# Patient Record
Sex: FEMALE
[De-identification: names, ages, dates, MRNs, and addresses within clinical notes are randomized per-mention and may not be internally consistent; named-entity substitution may affect disease eponyms.]

---

## 2019-01-23 LAB
ALBUMIN SERPL-MCNC: 3 G/DL (ref 3.5–5)
ALBUMIN/GLOB SERPL: 0.7 {RATIO} (ref 0.8–2)
ALP SERPL-CCNC: 192 IU/L (ref 40–150)
ALT SERPL-CCNC: 22 IU/L (ref 0–55)
ANION GAP SERPL CALC-SCNC: 9.1 MMOL/L (ref 8–16)
BASOPHILS # BLD AUTO: 0 10*3/UL (ref 0–0.1)
BASOPHILS NFR BLD AUTO: 0.4 % (ref 0–1)
BUN SERPL-MCNC: 17 MG/DL (ref 7–26)
BUN/CREAT SERPL: 24 (ref 6–25)
CALCIUM SERPL-MCNC: 8.9 MG/DL (ref 8.4–10.2)
CHLORIDE SERPL-SCNC: 102 MMOL/L (ref 98–107)
CO2 SERPL-SCNC: 27 MMOL/L (ref 22–29)
DEPRECATED APTT PLAS QN: 38.9 SECONDS (ref 23.8–35.5)
DEPRECATED INR PLAS: 1.19
DEPRECATED NEUTROPHILS # BLD AUTO: 1.3 10*3/UL (ref 2.1–6.9)
EGFRCR SERPLBLD CKD-EPI 2021: > 60 ML/MIN (ref 60–?)
EOSINOPHIL # BLD AUTO: 0.1 10*3/UL (ref 0–0.4)
EOSINOPHIL NFR BLD AUTO: 2.9 % (ref 0–6)
ERYTHROCYTE [DISTWIDTH] IN CORD BLOOD: 14.8 % (ref 11.7–14.4)
GLOBULIN PLAS-MCNC: 4.3 G/DL (ref 2.3–3.5)
GLUCOSE SERPLBLD-MCNC: 239 MG/DL (ref 74–118)
HCT VFR BLD AUTO: 37.1 % (ref 34.2–44.1)
HGB BLD-MCNC: 12.5 G/DL (ref 12–16)
LYMPHOCYTES # BLD: 1 10*3/UL (ref 1–3.2)
LYMPHOCYTES NFR BLD AUTO: 34.1 % (ref 18–39.1)
MCH RBC QN AUTO: 32.2 PG (ref 28–32)
MCHC RBC AUTO-ENTMCNC: 33.7 G/DL (ref 31–35)
MCV RBC AUTO: 95.6 FL (ref 81–99)
MONOCYTES # BLD AUTO: 0.4 10*3/UL (ref 0.2–0.8)
MONOCYTES NFR BLD AUTO: 15.4 % (ref 4.4–11.3)
NEUTS SEG NFR BLD AUTO: 47.2 % (ref 38.7–80)
PLATELET # BLD AUTO: 97 X10E3/UL (ref 140–360)
POTASSIUM SERPL-SCNC: 4.1 MMOL/L (ref 3.5–5.1)
PROTHROMBIN TIME: 16.2 SECONDS (ref 11.9–14.5)
RBC # BLD AUTO: 3.88 X10E6/UL (ref 3.6–5.1)
SODIUM SERPL-SCNC: 134 MMOL/L (ref 136–145)

## 2019-01-25 ENCOUNTER — HOSPITAL ENCOUNTER (OUTPATIENT)
Dept: HOSPITAL 88 - OR | Age: 69
Discharge: HOME | End: 2019-01-25
Attending: UROLOGY
Payer: MEDICARE

## 2019-01-25 VITALS — SYSTOLIC BLOOD PRESSURE: 108 MMHG | DIASTOLIC BLOOD PRESSURE: 64 MMHG

## 2019-01-25 DIAGNOSIS — N95.2: ICD-10-CM

## 2019-01-25 DIAGNOSIS — K74.60: ICD-10-CM

## 2019-01-25 DIAGNOSIS — Z01.812: ICD-10-CM

## 2019-01-25 DIAGNOSIS — R53.1: ICD-10-CM

## 2019-01-25 DIAGNOSIS — Z01.810: ICD-10-CM

## 2019-01-25 DIAGNOSIS — M19.90: ICD-10-CM

## 2019-01-25 DIAGNOSIS — Z79.84: ICD-10-CM

## 2019-01-25 DIAGNOSIS — I83.90: ICD-10-CM

## 2019-01-25 DIAGNOSIS — E11.9: ICD-10-CM

## 2019-01-25 DIAGNOSIS — N39.0: Primary | ICD-10-CM

## 2019-01-25 DIAGNOSIS — K21.9: ICD-10-CM

## 2019-01-25 DIAGNOSIS — K44.9: ICD-10-CM

## 2019-01-25 DIAGNOSIS — N81.3: ICD-10-CM

## 2019-01-25 DIAGNOSIS — I10: ICD-10-CM

## 2019-01-25 PROCEDURE — 85610 PROTHROMBIN TIME: CPT

## 2019-01-25 PROCEDURE — 74420 UROGRAPHY RTRGR +-KUB: CPT

## 2019-01-25 PROCEDURE — 36415 COLL VENOUS BLD VENIPUNCTURE: CPT

## 2019-01-25 PROCEDURE — 52005 CYSTO W/URTRL CATHJ: CPT

## 2019-01-25 PROCEDURE — 85730 THROMBOPLASTIN TIME PARTIAL: CPT

## 2019-01-25 PROCEDURE — 80053 COMPREHEN METABOLIC PANEL: CPT

## 2019-01-25 PROCEDURE — 93005 ELECTROCARDIOGRAM TRACING: CPT

## 2019-01-25 PROCEDURE — 85025 COMPLETE CBC W/AUTO DIFF WBC: CPT

## 2019-01-25 PROCEDURE — 82948 REAGENT STRIP/BLOOD GLUCOSE: CPT

## 2019-01-25 NOTE — XMS REPORT
Summary of Care: 3/26/18 - 3/26/18

                             Created on: 2034



ADITI JOSEPH

External Reference #: [id number]

: 1950

Sex: Female



Demographics







                          Address                   [address] [place] [address]  [address]

 

                          Home Phone                [phone]

 

                          Preferred Language        English

 

                          Marital Status            

 

                          Quaker Affiliation     None

 

                          Race                      Other

 

                                        Additional Race(s)  

 

                          Ethnic Group              Non-





Author







                          Author                    [facility]

 

                          Address                   Unknown

 

                          Phone                     Unavailable







Encounter





HQ Luda_jeny(FIN) [id number] Date(s): 3/26/18 - 3/26/18

[facility]

Attending Physician: Marycruz Diamond DO





Vital Signs





No data available for this section



Problem List







    



              Condition     Effective Dates     Status       Health Status     Informant

 

    



                           Diabetes(Confirmed)       Active  

 

    



                           Acid reflux               Active  



                                         disease(Confirmed)    

 

    



                           Hypertension(Confirm      Active  



                                         ed)    

 

    



                           Radiculopathy(Confir      Active  



                                         med)1    

 

    



                           Simple                    Active  



                                         obesity(Confirmed)    







1L3-4



Allergies, Adverse Reactions, Alerts







   



                 Substance       Reaction        Severity        Status

 

   



                           NKDA                      Active







Medications





No data available for this section



Results





No data available for this section



Immunizations





No data available for this section



Procedures







    



              Procedure     Date         Related Diagnosis     Body Site     Status

 

    



                           Biopsy of liver           Completed

 

    



                            section          Completed

 

    



                           Cholecystectomy           Completed

 

    



                           Colonoscopy               Completed

 

    



                           Excision of cyst          Completed

 

    



                           Lumpectomy of breast        Completed







Social History







 



                           Social History Type       Response

 

 



                           Smoking Status            Never smoker; Exposure to Tobacco Smoke None; Cigarette Smoking

 Last 365



                                         Days No; Reg Smoking Cessation Counseling No



                                         entered on: 18







Assessment and Plan





No data available for this section

## 2019-01-25 NOTE — XMS REPORT
Summary of Care: 18 - 5/10/18

                             Created on: 2100



ADITI JOSEPH

External Reference #: [id number]

: 1950

Sex: Female



Demographics







                          Address                   [place]  [address]

 

                          Home Phone                [phone]

 

                          Preferred Language        English

 

                          Marital Status            

 

                          Amish Affiliation     None

 

                          Race                      Other

 

                                        Additional Race(s)  

 

                          Ethnic Group              Non-





Author







                          Author                    [facility]

 

                          Organization              [facility]

 

                          Address                   Unknown

 

                          Phone                     Unavailable







Encounter





HQ Luis Antonior_jeny(FIN) [id number] Date(s): 18 - 5/10/18

[facility] [address] [facility], [address] [place] [address]-    
 





Vital Signs





No data available for this section



Problem List







    



              Condition     Effective Dates     Status       Health Status     Informant

 

    



                           Diabetes(Confirmed)       Active  

 

    



                           Acid reflux               Active  



                                         disease(Confirmed)    

 

    



                           Hypertension(Confirm      Active  



                                         ed)    

 

    



                           Radiculopathy(Confir      Active  



                                         med)1    

 

    



                           Simple                    Active  



                                         obesity(Confirmed)    







1L3-4



Allergies, Adverse Reactions, Alerts







   



                 Substance       Reaction        Severity        Status

 

   



                           NKDA                      Active







Medications





No data available for this section



Results





No data available for this section



Immunizations





No data available for this section



Procedures







    



              Procedure     Date         Related Diagnosis     Body Site     Status

 

    



                           Biopsy of liver           Completed

 

    



                            section          Completed

 

    



                           Cholecystectomy           Completed

 

    



                           Colonoscopy               Completed

 

    



                           Excision of cyst          Completed

 

    



                           Lumpectomy of breast        Completed







Social History







 



                           Social History Type       Response

 

 



                           Smoking Status            Never smoker; Exposure to Tobacco Smoke None; Cigarette Smoking

 Last 365



                                         Days No; Reg Smoking Cessation Counseling No



                                         entered on: 18







Assessment and Plan





No data available for this section

## 2019-01-25 NOTE — XMS REPORT
Summary of Care: 3/27/18 - 3/28/18

                             Created on: 2034



ADITI JOSEPH

External Reference #: [id number]

: 1950

Sex: Female



Demographics







                          Address                   [address] [place] [address]  [address]

 

                          Home Phone                [phone]

 

                          Preferred Language        English

 

                          Marital Status            

 

                          Protestant Affiliation     None

 

                          Race                      Other

 

                                        Additional Race(s)  

 

                          Ethnic Group              Non-





Author







                          Author                    [facility]              [facility]

 

                          Address                   Unknown

 

                          Phone                     Unavailable







Encounter





HQ Luda_jeny(FIN) [id number] Date(s): 3/27/18 - 3/28/18

[facility] [address], [address] [place] [address] 





Vital Signs





No data available for this section



Problem List







    



              Condition     Effective Dates     Status       Health Status     Informant

 

    



                           Diabetes(Confirmed)       Active  

 

    



                           Acid reflux               Active  



                                         disease(Confirmed)    

 

    



                           Hypertension(Confirm      Active  



                                         ed)    

 

    



                           Radiculopathy(Confir      Active  



                                         med)1    

 

    



                           Simple                    Active  



                                         obesity(Confirmed)    







1L3-4



Allergies, Adverse Reactions, Alerts







   



                 Substance       Reaction        Severity        Status

 

   



                           NKDA                      Active







Medications





No data available for this section



Results





No data available for this section



Immunizations





No data available for this section



Procedures







    



              Procedure     Date         Related Diagnosis     Body Site     Status

 

    



                           Biopsy of liver           Completed

 

    



                            section          Completed

 

    



                           Cholecystectomy           Completed

 

    



                           Colonoscopy               Completed

 

    



                           Excision of cyst          Completed

 

    



                           Lumpectomy of breast        Completed







Social History







 



                           Social History Type       Response

 

 



                           Smoking Status            Never smoker; Exposure to Tobacco Smoke None; Cigarette Smoking

 Last 365



                                         Days No; Reg Smoking Cessation Counseling No



                                         entered on: 18







Assessment and Plan





No data available for this section

## 2019-01-25 NOTE — XMS REPORT
Continuity of Care Document

                             Created on: 2019



ADITI JOSEPH

External Reference #: [id number]

: 1950

Sex: Female



Demographics







                          Address                   [address] [place] [address]  [address]

 

                          Home Phone                [phone]

 

                          Preferred Language        Unknown

 

                          Marital Status            Unknown

 

                          Denominational Affiliation     Unknown

 

                          Race                      Unknown

 

                          Ethnic Group              Unknown





Author







                          Author                    Narciso munguiaann

 

                          [facility]              Interface

 

                          Address                   Unknown

 

                          Phone                     Unavailable



                                                    



Problems

                    





                    Problem                            Status                            Onset Date     

                          Classification                            Date Reported       

                          Comments                            Source                    

 

                    LUMBAR PAIN                            Active                            2018 

                                                                                       

                                        [facility]                   

 

 

                    UNK                            Active                            05/10/2018         

                                                                                        

                                        [facility]                    

 

                          LBP, SYSTEMIC LUPUS, FATTY LIVER                            Active                

                    2018                                                                 

                                                      [facility] [place],[facility]

 [place]                    

 

                                        Spondylosis without myelopathy or radiculopathy, lumbar region                  

                                                2018                                                         [facility]

 [place]                    

 

                          K74.60 - UNSPECIFIED CIRRHOSIS OF LIVER                            Active         

                    2017                                                          

                                                       [facility] [place]               

     

 

                    Simple obesity                            Active                                    

                          Problem                            2018                    

                                                      [facility], [facility] [place],[facility] [place],[facility] [place],[facility]                    

 

                    Diabetes                            Active                                          

                    Problem                            2018                            

                                         [facility] [place],[facility]                    

 

                    Acid reflux disease                            Active                               

                          Problem                            2018               

                                                       [facility] [place],[facility]              

      

 

                    Hypertension                            Active                                      

                          Problem                            2018                      

                                                       [facility] [place],[facility]                    

 

                          Radiculopathy<sup>1</sup>                            Active                       

                                                Problem                            2018         

                          L3-4                             [facility] [place],[facility]    

                

 

                    Low back pain                                                                       

                                                2018                                    

                                         [facility] [place]                    

 

                                        Spinal stenosis, lumbar region without neurogenic claudication                  

                                                                                                 

                    2018                                                         [facility] [place]

                    

 

                                        Other specific arthropathies, not elsewhere classified, other specified site    

                                                                                       

                     2018                                                        

 [facility] [place]                    

 

                          Spondylolisthesis, lumbosacral region                                             

                                                                            2018          

                                                       [facility] [place]                    

 

                    Localized edema                                                                     

                                                2018                                  

                                         [facility] [place]                    

 

                    Diabetes                            Active                                          

                    Problem                            2018                            

                                         [facility] [place],[facility] [place]                    

 

                    Acid reflux disease                            Active                               

                          Problem                            2018               

                                                       [facility] [place],[facility] [place]            

        

 

                    Hypertension                            Active                                      

                          Problem                            2018                      

                                                       [facility] [place],[facility] [place]                   

 

 

                          Radiculopathy<sup>1</sup>                            Active                       

                                                Problem                            2018         

                          L3-4                             [facility] [place],[facility] [place]  

                  

 

                    Diabetes                            Active                                          

                    Problem                            2018                            

                                         [facility]        

            

 

                    Acid reflux disease                            Active                               

                          Problem                            2018               

                                                       [facility]

                    

 

                    Hypertension                            Active                                      

                          Problem                            2018                      

                                                       [facility]    

                

 

                          Radiculopathy<sup>1</sup>                            Active                       

                                                Problem                            2018         

                          L3-4                             [facility]                    

 

                    LOW BACK PAIN                            Active                                     

                                                                                        

                                        [facility]                    



                                                                                
                                                                                
                                                                                
                                                                                
                                                                                
                                                   



Medications

                    





                    Medication                            Details                            Route      

                          Status                            Patient Instructions         

                          Ordering Provider                            Order Date           

                                        Source                    

 

                                        Calcium Chloride 0.0014 MEQ/ML / Potassium Chloride 0.004 MEQ/ML / Sodium Chloride

 0.103 MEQ/ML / Sodium Lactate 0.028 MEQ/ML Injectable Solution                 
                                        1,000 mL, Rate: 25 ml/hr, Infuse over: 40 hr, Route: IV, Dosing Weight 79.205

 kg, Total Volume: 1,000, Start date: 18 13:26:00 CDT, Duration: 30 day, 
Stop date: 18 13:25:00 CDT, 1.88, m2                                         

                    Inactive                                                                

                          2018                            [facility]               

     

 

                          Acetaminophen                            1,000 mg, Route: PO, Drug form: TAB, ONCE,

 Dosing Weight 79.205, kg, PRN Pain Score 1-3, Start date: 18 13:26:00 CDT
                                                       Inactive                      

                                                                            2018               

                                        [facility]                    

 

                          Labetalol                            5 mg, Route: IVP, Q5Min, Dosing Weight 79.205,

 kg, PRN Elevated BP, Start date: 18 13:26:00 CDT, Duration: 5 doses or 
times, Stop date: Limited # of times                                               

                    Inactive                                                                      

                          2018                            [facility]                    

 

                          Fentanyl                            25 microgram, Route: IVP, Q5Min, Dosing Weight

 79.205, kg, PRN, Priority: Routine, Start date: 18 13:26:00 CDT, 
Duration: 4 doses or times, Stop date: Limited # of times, Pain Score 4-10      
                                                 Inactive                              

                                                      2018                     

                                        [facility]                    

 

                          Hydromorphone                            0.5 mg, Route: IVP, Q5Min, Dosing Weight 

79.205, kg, PRN Pain Score 7-10, Start date: 18 13:26:00 CDT, Duration: 4 
doses or times, Stop date: Limited # of times                                      

                    Inactive                                                             

                          2018                            [facility]            

        

 

                          Naloxone                            0.4 mg, Route: IVP, Q2MIN, Dosing Weight 79.205,

 kg, PRN Narcotic Reversal, Start date: 18 13:26:00 CDT, Duration: 8 doses
or times, Stop date: Limited # of times                                            

                    Inactive                                                                   

                          2018                            [facility]                  

  

 

                          Flumazenil                            0.2 mg, Route: IVP, PRN, Dosing Weight 79.205,

 kg, PRN Benzodiazepine Reversal, Initial dose, Start date: 18 13:26:00 
CDT, Duration: 30 day, Stop date: 18 13:25:00 CDT                            

                            Inactive                                                 

                                                2018                            [facility]  

                  

 

                          Diphenhydramine                            12.5 mg, Route: IVP, Drug form: INJ, Q6H,

 Dosing Weight 79.205, kg, PRN Itching, Start date: 18 13:26:00 CDT, 
Duration: 30 day, Stop date: 18 13:25:00 CDT                                 

                       Inactive                                                        

                            2018                            [facility]       

             

 

                          Albuterol 0.83 MG/ML Inhalant Solution                            2.49 mg, Route: 

NEB, Q20Min, Dosing Weight 79.205, kg, PRN Wheezing, Priority: STAT, Start date:
18 13:26:00 CDT, Duration: 30 day, Stop date: 18 13:25:00 CDT       
                                                Inactive                               

                                                      2018                      

                                        [facility]                    

 

                          Dexamethasone                            4 mg, Route: IVP, ONCE, Dosing Weight 79.205,

 kg, PRN Nausea & Vomiting, Start date: 18 13:26:00 CDT                   
                                                Inactive                                           

                                                2018                            [facility]

                    

 

                          Promethazine                            6.25 mg, Route: IVPB, ONCE, Dosing Weight 

79.205, kg, PRN Nausea & Vomiting, Start date: 18 13:26:00 CDT            
                                                Inactive                                    

                                                2018                            [facility]                    

 

                          Ondansetron                            4 mg, Route: IVP, ONCE, Dosing Weight 79.205,

 kg, PRN Nausea & Vomiting, Start date: 18 13:26:00 CDT                   
                                                Inactive                                           

                                                2018                            [facility]

                    

 

                          Hydralazine                            5 mg, Route: IVP, Q20Min, Dosing Weight 79.205,

 kg, PRN Elevated BP, Start date: 18 13:26:00 CDT, Duration: 2 doses or 
times, Stop date: Limited # of times                                               

                    Inactive                                                                      

                          2018                            [facility]                    

 

                          Calcium 600+D oral tablet                            1 tab, PO, Daily, 0 Refill(s)

                                                        Active                       

                                                                            2018                

                                        [facility]                    

 

                          Lactulose 667 MG/ML Oral Solution                            10 gm=15 mL, PO, BID,

 PRN constipation, # 240 mL, 0 Refill(s)                                           

                    Active                                                                    

                          2018                            [facility]                   

 

 

                                        Metoprolol Succinate ER 25 mg oral tablet, extended release                     

                          25 mg=1 tab, PO, Daily, # 30 tab, 0 Refill(s)                                 

                       Active                                                          

                          2018                            [facility]         

           

 

                          azaTHIOprine 50 mg oral tablet                            50 mg=1 tab, PO, Daily, 

# 30 tab, 0 Refill(s)                                                        Active  

                                                                                  2018

                                        [facility]                    

 

                          Hydroxychloroquine Sulfate 200 MG Oral Tablet                            400 mg=2 

tab, PO, Daily, # 60 tab, 0 Refill(s)                                              

                    Active                                                                       

                          2018                            [facility]                    

 

                          gabapentin 300 MG Oral Capsule                            See Instructions, PO, Take

 one cap PO at Bedtime x 3 nights, then 1 cap PO BID x 3 days, then 1 cap PO TID
thereafter, # 90 cap, 0 Refill(s), Pharmacy: [id number]               
                                                Active                                         

                                                2018                            [facility]                    

 

                          Hydroxychloroquine                            200 mg, PO, Daily, 0 Refill(s)      

                                                      No Longer Active                   

                                                                            2018            

                                        [facility]                    

 

                          Omeprazole                            40 mg, PO, Daily, 0 Refill(s)               

                                                Active                                        

                                                2018                            [facility]

 [facility]                    

 

                          Losartan                            25 mg, PO, Daily, 0 Refill(s)                 

                                                Active                                          

                                                2018                            [facility]

 [facility]                    

 

                          Metformin                            500 mg, PO, Daily, 0 Refill(s)               

                                                Active                                        

                                                2018                            [facility]

 [facility]                    



                                                                                
                                                                                
                                                                                
                                                                                
                                                                                
                                   



Allergies, Adverse Reactions, Alerts

                    





                    Substance                            Category                            Reaction   

                          Severity                            Reaction type           

                          Status                            Date Reported                     

                          Comments                            Source                    



                                                                



Immunizations

                    





                    Immunization                            Date Given                            Site  

                          Status                            Last Updated             

                          Comments                            Source                    



                                                                        



Results

                    





                    Order Name                            Results                            Value      

                          Reference Range                            Date                

                          Interpretation                            Comments                       

                                        Source                    

 

                    HEMATOLOGY                            PTT                            33.7 s         

                          22.9 - 35.8                            2018                

                                                                            [facility]       

             

 

                    HEMATOLOGY                            PT                            17.0 s          

                          12.0 - 14.7                            2018                 

                                                                            [facility]        

            

 

                    HEMATOLOGY                            INR                            1.38           

                          0.85 - 1.17                            2018                 

                                                                            [facility]        

            

 

                    HEMATOLOGY                            MPV                            8.9 fL         

                          7.4 - 10.4                            2018                 

                                                                            [facility]                            Hct                            36.0 %         

                          36.0 - 48.0                            2018                

                                                                            [facility]                            Hgb                            12.5 g/dL      

                          12.0 - 16.0                            2018             

                                                                            [facility]                            RBC                            3.65 M/CMM     

                          4.20 - 5.40                            2018            

                                                                            [facility]                            WBC                            3.3 K/CMM      

                          3.7 - 10.4                            2018              

                                                                            [facility] [facility]                            MCHC                            34.8 g/dL     

                          32.0 - 36.0                            2018            

                                                                            [facility]                            RDW                            14.5 %         

                          11.5 - 14.5                            2018                

                                                                            [facility]                            MCV                            98.7 fL        

                          80.0 - 98.0                            2018               

                                                                            [facility]                            MCH                            34.3 pg        

                          27.0 - 31.0                            2018               

                                                                            [facility]                            Platelet                            79 K/CMM  

                           133 - 450                            2018           

                                                                            [facility]                            Monocytes                            14.2 %   

                          2.0 - 12.0                            2018           

                                                                            [facility]                            Eosinophils                            2.3 %  

                           0.0 - 4.0                            2018           

                                                                            [facility]                            Basophils                            0.4 %    

                          0.0 - 1.0                            2018             

                                                                            [facility]                            Segs                            54.8 %        

                          45.0 - 75.0                            2018               

                                                                            [facility]                            Monocytes #                            0.5 K/CMM

                             0.0 - 0.8                            2018         

                                                                            [facility]                            Lymphocytes                            28.3 % 

                            20.0 - 40.0                            2018        

                                                                            [facility]                            Eosinophils #                            0.1 K/CMM

                             0.0 - 0.5                            2018         

                                                                            [facility]                            Lymphocytes #                            0.9 K/CMM

                             1.0 - 5.5                            2018         

                                                                            [facility]                            Segs-Bands #                            1.8 K/CMM

                             1.5 - 8.1                            2018         

                                                                            [facility]

                    

 

                          Chest 2 views DX                            Chest 2 views DX                      

                                        EXAM: XR CHEST 2 VIEWS



DATE: 2018 12:59 PM CDT



INDICATION:  - G89.29   Other chronic pain; pre-operative clearance



COMPARISON: None



TECHNIQUE: PA and lateral chest radiographs



FINDINGS: 



There are tiny nodular perihilar markings.

There is no pleural effusion. 

The cardiomediastinal silhouette is normal.

There is no acute bony abnormality.

   



IMPRESSION:  



Tiny nodular perihilar markings may be vascular, but nonemergent evaluation with
contrast-enhanced chest CT is recommended.



                                                          2018                 

                                                      -

                                        -





Read by:  Bob Overton Date/time:  18 14:30

Electronically Signed by:  Bob Overton              18 
14:33

FINAL REPORT

                                         ANTONETTE Lopez                    

 

                    ELECTROLYTES                            AGAP                            8.9 meq/L   

                          10.0 - 20.0                            2018          

                                                                            [facility] 

                   

 

                    ELECTROLYTES                            eGFR                            94 mL/min/1.73m2

                                                         2018                  

                                                      Result Comment: The eGFR is calculated using the

 CKD-EPI formula. In most young, healthy individuals the eGFR will be >90 
mL/min/1.73m2. The eGFR declines with age. An eGFR of 60-89 may be normal in 
some populations, particularly the elderly, for whom the CKD-EPI formula has not
been extensively validated. Use of the eGFR is not recommended in the following 
populations:



Individuals with unstable creatinine concentrations, including pregnant patients
and those with serious co-morbid conditions.



Patients with extremes in muscle mass or diet. 



The data above are obtained from the National Kidney Disease Education Program 
(NKDEP) which additionally recommends that when the eGFR is used in patients 
with extremes of body mass index for purposes of drug dosing, the eGFR should be
multiplied by the estimated BMI.                            [facility]           

         

 

                    ELECTROLYTES                            Chloride Lvl                            105 

meq/L                             95 - 109                            2018     

                                                                               [facility]

                    

 

                    ELECTROLYTES                            Sodium Lvl                            139 meq/L

                             135 - 145                            2018         

                                                                            [facility]

                    

 

                    ELECTROLYTES                            Potassium Lvl                            3.9

 meq/L                             3.5 - 5.1                            2018   

                                                                                 [facility]

                    

 

                    ELECTROLYTES                            Creatinine Lvl                            0.63

 mg/dL                             0.50 - 1.40                            2018 

                                                                                   [facility]

                    

 

                    ELECTROLYTES                            Calcium Lvl                            8.6 mg/dL

                             8.5 - 10.5                            2018        

                                                                            [facility]

                    

 

                    ELECTROLYTES                            CO2                            29 meq/L     

                          24 - 32                            2018                

                                                                            [facility]       

             

 

                    ELECTROLYTES                            BUN                            16 mg/dL     

                          7 - 22                            2018                 

                                                                            [facility]        

            

 

                    ELECTROLYTES                            Glucose Lvl                            188 mg/dL

                             70 - 99                            2018           

                                                                            [facility]  

                  

 

                    HEMATOLOGY                            PTT                            38.5 s         

                          22.9 - 35.8                            2018                

                                                                            [facility]       

             

 

                    HEMATOLOGY                            PT                            16.4 s          

                          12.0 - 14.7                            2018                 

                                                                            [facility]        

            

 

                    HEMATOLOGY                            INR                            1.31           

                          0.85 - 1.17                            2018                 

                                                                            [facility]        

            

 

                    HEMATOLOGY                            Eosinophils #                            0.1 K/CMM

                             0.0 - 0.5                            2018         

                                                                            [facility]                            Monocytes #                            0.4 K/CMM

                             0.0 - 0.8                            2018         

                                                                            [facility]                            Lymphocytes #                            0.8 K/CMM

                             1.0 - 5.5                            2018         

                                                                            [facility]                            Eosinophils                            2.8 %  

                           0.0 - 4.0                            2018           

                                                                            [facility]                            Segs-Bands #                            1.5 K/CMM

                             1.5 - 8.1                            2018         

                                                                            [facility]                            Basophils                            0.4 %    

                          0.0 - 1.0                            2018             

                                                                            [facility]                            Monocytes                            15.2 %   

                          2.0 - 12.0                            2018           

                                                                            [facility]                            Lymphocytes                            28.0 % 

                            20.0 - 40.0                            2018        

                                                                            [facility]                            Segs                            53.6 %        

                          45.0 - 75.0                            2018               

                                                                            [facility]                            MPV                            8.7 fL         

                          7.4 - 10.4                            2018                 

                                                                            [facility]                            Platelet                            79 K/CMM  

                           133 - 450                            2018           

                                                                            [facility]                            RDW                            14.1 %         

                          11.5 - 14.5                            2018                

                                                                            [facility]                            MCHC                            34.3 g/dL     

                          32.0 - 36.0                            2018            

                                                                            [facility]                            MCH                            33.6 pg        

                          27.0 - 31.0                            2018               

                                                                            [facility]                            WBC                            2.9 K/CMM      

                          3.7 - 10.4                            2018              

                                                                            [facility]                            RBC                            3.73 M/CMM     

                          4.20 - 5.40                            2018            

                                                                            [facility]                            MCV                            97.9 fL        

                          80.0 - 98.0                            2018               

                                                                            [facility]                            Hgb                            12.5 g/dL      

                          12.0 - 16.0                            2018             

                                                                            [facility]                            Hct                            36.6 %         

                          36.0 - 48.0                            2018                

                                                                            [facility]       

             

 

                          Spine lumbar wo contrast MRI                            Spine lumbar wo contrast MRI

                                        EXAM: MRI LUMBAR SPINE WITHOUT CONTRAST

DATE: 3/13/2018 4:19 PM CDT .

 

ORDERING PHYSICIAN: Gabriela Mccann NP



CLINICAL INDICATION: M54.5   Low back pain - M54.5   Low back pain;    



TECHNIQUE: Multiplanar, multisequence MRI lumbar spine without IV contrast





COMPARISON: Unavailable



FINDINGS: 



For the purposes of enumeration, the lowest well formed intervertebral disc was 
counted as L5-S1 on this exam.



INTRASPINAL CONTENTS/CONUS: The conus terminates at L1-L2.  No definite dural 
based lesion.



VERTEBRAE:  The vertebrae are normal in height and alignment. There is edema in 
the left greater than right L5 and S1 pedicles and facets.



PARASPINAL SOFT TISSUES: No edema or definite masses. No aortic aneurysm.





DISC SPACES, SPINAL CANAL, AND NEURAL FORAMINA:



T12-L1. Intervertebral disc height and signal are maintained.  Posterior 
elements are normal. There is no stenosis.



L1-L2.   Intervertebral disc height and signal are maintained.  Posterior 
elements are normal. There is no stenosis.



L2-L3.   Intervertebral disc height and signal are maintained.  Posterior 
elements are normal. There is no stenosis.



L3-L4.   Dehydrated disc with shallow diffuse bulge asymmetric to the left 
foramen and extra foraminal zone. Facets are unremarkable. Since canal measures 
11 mm. Lateral recesses are patent. Neural foramina are patent.



L4-L5.   Short pedicles at this level. Desiccated disc with shallow 1 to 2 mm 
diffuse disc bulge and ventral annular fissuring. There is facet hypertrophy 
ligamentous redundancy. Central canal measures 5 mm with crowded cauda equina. 
There is bilateral lateral recess narrowing. Disc and facets mildly narrow the 
neural foramina, but does not contact the exiting L4 nerve roots. 



L5-S1.   There is left greater than right facet hypertrophy. There is 3 mm 
anterolisthesis of L5 relative S1. The disc is dehydrated with annular 
pseudobulge and fissuring. Central canal measures 5 mm with crowded cauda 
equina. Lateral recesses are effaced with nonvisualization of descending S1 
nerve roots. There is severe narrowing of left neural foramen, and the L5 
pedicle, enlarged facets, and annular pseudobulge all deformed exiting nerve 
root.





IMPRESSION: 



                                        1. L4-L5 spinal stenosis and cauda equina crowding. There is mild narrowing of the

 neural foramina.



                                        2. L5-S1 facet arthropathy, spondylolisthesis, spinal stenosis with cauda equina

 crowding, and severe left neural foramen narrowing



                                        3. Left greater than right stress edema in the L5 and S1 pedicle



                                                          2018                 

                                                      -

                                        -





Read by:  Benigno Raines MD

Dictated Date/time:  18 17:11

Electronically Signed by:  Benigno Raines MD                 18 
09:27

FINAL REPORT

                                        AFSANEH Lopez                    

 

                          Breast Mammo Scrn KARIN incl CAD MA                            Breast Mammo Scrn KARIN

 incl CAD MA                         





BILATERAL DIGITAL SCREENING MAMMOGRAM WITH CAD: 12/15/2017





CLINICAL: Encounter For Screening Mammogram For Malignant Neoplasm Of 
Breast/Z12.31.  





Current study was evaluated with a Computer Aided Detection (CAD) system.  



COMPARISON:No prior exams were available for comparison.   



TECHNIQUE: Mammographic views were obtained using digital acquisition. Current 
study was also evaluated with a Computer Aided Detection (CAD) system. 



FINDINGS: 

The tissue of both breasts is extremely dense. This may lower the sensitivity of
mammography.  



There are benign appearing calcifications in both breasts.  

No significant masses, calcifications, or other findings are seen in either 
breast.  





IMPRESSION: BENIGN



RECOMMENDATION:There is no mammographic evidence of malignancy. A 1 year 
screening mammogram is recommended.(2018)   This exam was interpreted at 
[id number] for NORA Khan 15.  



Professional services are provided by the [facility] 
Division of Diagnostic Imaging.



Cody Burgos M.D.          

cm/penrad:2017 10:26:25  



Imaging Technologist(s): RT Haile(R)(M), [facility]

letter sent: BI-RADS 1/2 Dense  

Mammogram BI-RADS: 2 Benign

                                                          12/15/2017                 

                                                      -

                                        -





Read by:  Ricki Shannon MD

Dictated Date/time:  17 10:26

Electronically Signed by:  Ricki Shannon MD                      17 
10:26

FINAL REPORT

                                         MAYELINMARY John                    

 

                          Bone Density DXA Dual Energy MA                            Bone Density DXA Dual Energy

 MA                         



BONE DENSITY ASSESSMENT: 12/15/2017



CLINICAL DATA: Post menopausal.  Encounter For Screening For 
Osteoporosis/Z13.820



FINDINGS: 

Bone density evaluation was performed 12/15/2017 on the right femur neck using a
Hologic unit. The BMD average for the exam is 0.644  g/cm2. The T-score is -1.80
and the Z-score is -0.50.  This matches the World Health Organization's criteria
for osteopenia and places the patient at a medium risk for fracture.  



An additional bone density evaluation was performed 12/15/2017 on the left femur
neck using a Hologic unit. The BMD average for the exam is 0.608  g/cm2. The T-
score is -2.20 and the Z-score is -0.80.  This matches the World Health 
Organization's criteria for osteopenia and places the patient at a medium risk 
for fracture.  



An additional bone density evaluation was performed 12/15/2017 on the right hip 
using a Hologic unit. The BMD average for the exam is 0.808  g/cm2. The T-score 
is -1.10.  This matches the World Health Organization's criteria for osteopenia 
and places the patient at a medium risk for fracture.  



An additional bone density evaluation was performed 12/15/2017 on the left hip 
using a Hologic unit. The BMD average for the exam is 0.769  g/cm2. The T-score 
is -1.40 and the Z-score is -0.30.  This matches the World Health Organization's
criteria for osteopenia and places the patient at a medium risk for fracture.  



An additional bone density evaluation was performed 12/15/2017 on the AP L1-L3 
region of spine using a Hologic unit. The BMD average for the exam is 0.608  
g/cm2. The T-score is -2.20 and the Z-score is -0.80.  This matches the World 
Health Organization's criteria for osteopenia and places the patient at a medium
risk for fracture.  



FRAX 10 year probability of major osteoporotic fracture is 5.5% and hip fracture
is 0.8%.  



IMPRESSION: OSTEOPENIA

Patient is at medium risk for fracture.    This exam was interpreted at [id number]
for  NORA Jacob 15.  



Cody Burgos M.D., cm/can:12/15/2017 11:58:01  



Imaging Technologist(s): Kamla LISA(COLTEN)(SANDRA), [facility]

                                                          12/15/2017                 

                                                      -

                                        -





Read by:  Ricki Shannon MD

Dictated Date/time:  12/15/17 11:58

Electronically Signed by:  Ricki Shannon MD                      12/15/17 
11:58

FINAL REPORT

                                        [facility]                    

 

                    Liver US                            Liver US                            Exam: Liver 

ultrasound



Reason for Exam: K74.60   Unspecified cirrhosis of liver



Comparison Exam: None



Discussion:



Multiple axial and sagittal images were obtained of the liver.  The liver is of 
normal echogenicity and size, measuring 15.3 cm. in length.  Contour of the 
liver is slightly nodular, suggestive of early cirrhotic changes. No focal 
hepatic masses. The main portal vein is hepatopetal in flow measuring 
approximately 0.9 cm in diameter. The visualized portions of the hepatic veins 
and hepatic arteries are unremarkable.  No intrahepatic or extrahepatic biliary 
duct dilation, with the common bile duct measuring 0.2 cm. Patient is status 
post cholecystectomy. Pancreas is not adequately seen secondary to overlying 
bowel gas.   No evidence seen for ascites.





Impression:



                                        1. Contour of the liver is slightly nodular, suggestive of early cirrhotic changes.





                                                          2017                 

                                                      -

                                        -





Read by:  Mario Isbell MD

Dictated Date/time:  17 10:32

Electronically Signed by:  Mario Isbell MD                   17 
10:36

FINAL REPORT

                                         [facility] [place]                    



                                                                                
                                                                                
                                                                                
                                                                                
                                                                                
                                                                                
                                                                                
                                                                                
                                                                                
                                                                                
                                                                                
                                                                                
                                                    



Vital Signs

                    





                    Vital Sign                            Value                            Date         

                          Comments                            Source                    

 

                    Respitory Rate                            20                             2018 

                                                       [facility]                  

  

 

                    Systolic (mm Hg)                            145                             2018

                                                        [facility]                 

   

 

                    Diastolic (mm Hg)                            71                             2018

                                                        [facility]                 

   

 

                    Respitory Rate                            17                             2018 

                                                       [facility]                  

  

 

                    Systolic (mm Hg)                            140                             2018

                                                        [facility]                 

   

 

                    Diastolic (mm Hg)                            78                             2018

                                                        [facility]                 

   

 

                    Respitory Rate                            18                             2018 

                                                       [facility]                  

  

 

                    Systolic (mm Hg)                            143                             2018

                                                        [facility]                 

   

 

                    Diastolic (mm Hg)                            82                             2018

                                                        [facility]                 

   

 

                    Heart Rate                            72                             2018     

                                                      [facility]                    

 

                    Temperature Oral (F)                            98.0 F                            2018

                                                        [facility]                 

   

 

                    Height                            154.94 cm                            2018   

                                                      [facility]                    



 

                    BMI Calculated                            32.99                             2018

                                                        [facility]                 

   

 

                    Weight                            79.205                             2018     

                                                      [facility]                    

 

                    Weight                            75                             04/10/2018         

                                                      [facility]                    

 

                    Height                            154.94 cm                            04/10/2018   

                                                      [facility]                   

 

 

                    BMI Calculated                            31.24                             04/10/2018

                                                        [facility]                

    

 

                    Heart Rate                            79                             04/10/2018     

                                                      [facility]                    

 

                    Systolic (mm Hg)                            156                             04/10/2018

                                                        [facility]                

    

 

                    Diastolic (mm Hg)                            85                             04/10/2018

                                                        [facility]                

    

 

                    BMI Calculated                            30.71                             2018

                                                        [facility]                

    

 

                    Weight                            76.165                             2018     

                                                      [facility]                    

 

                    Height                            157.48 cm                            2018   

                                                      [facility]                   

 



                                                                                
                                                                                
                                                                                
                                                                                
                                                                                
                                   



Encounters

                    





                    Location                            Location Details                            Encounter

 Type                            Encounter Number                            Reason For

 Visit                            Attending Provider                            ADM Date

                            DC Date                            Status                

                                        Source                    

 

                          [facility]                            [id number]                  

                                                Kirt Perdomoeyal                             2017                                               

                                        [facility] [place]                    

 

                          [facility] [place]                                                

                          Outpt Diag Services                            [id number]                  

                                                Rosendo Varela                             12/15/2017

                            2017                                               

                                        [facility] [place]                    

 

                                                                            Outpatient                  

                    [id number]                                                        GABRIELA SARAH

                             2018                                              

                          Active                            [facility]                    

 

                    [facility]                                                        Outpatient

                            [id number]                                             

                          Marycruz Diamond                             2018                                                        [facility]

                    

 

                          [facility] [place]                                                

                          Outpt Diag Services                            [id number]                  

                                                Cristhian Winsome                             2018                                               

                                        [facility] [place]                    

 

                    [facility] [place]                                                        OP Therapy Patients

                            [id number]                                             

                          Marycruz Diamond                             2018                                                        [facility] [place]

                    

 

                    [facility] [place]                                                        OP

 Therapy Patients                            [id number]                            

                            Marycruz Diamond                             2018                                               

                                        [facility]                    

 

                          [facility] [place]                                                    

                    Phone Message                            [phone]                              

                                                        2018                                                        [facility]

                    

 

                          [facility] [place]                                                    

                    Phone Message                            [phone]                              

                                                        2018                                                        [facility]

                    

 

                                                                            Outpatient                  

                    [id number]                                                        CRISTHIAN WINSOME

                             04/10/2018                                              

                          Active                            [facility]                    

 

                    [facility]                                                        Outpatient

                            [id number]                                             

                          Marycruz Diamond                             04/10/2018                 

                    2018                                                        [facility]

                    

 

                                                                            Outpatient                  

                    [id number]                                                        ANALY DIXON

                             2018                                              

                          Active                            [facility]                                                        Outpatient

                            [id number]                                             

                          Marycruz Diamond                             2018                                                        [facility]

                    

 

                    [facility]                                                        Phone

 Message                            [phone]                                     

                                                  2018                                                        [facility]      

              

 

                    [facility]                                                        Phone

 Message                            [phone]                                     

                                                  2018                                                        [facility]      

              

 

                    [facility]                                                        Phone

 Message                            [phone]                                     

                                                  2018                                                        [facility]      

              

 

                          [facility] [place]                                                

                          Outpt Diag Services                            [id number]                  

                                                Rosendo Varela                             2018

                            05/15/2018                                               

                                        [facility] [place]                    

 

                                                                            Outpatient                  

                    [id number]                                                        ANALY DIXON

                             2018                                              

                          Active                            [facility]                                               

                          Day Surgery                            [id number]                         

                                                Analy Dixon                             2018                                                     

                                         [facility]                    

 

                    [facility]                                                        Phone

 Message                            [phone]                                     

                                                  2018                                                        [facility]      

              

 

                                                                            Outpatient                  

                    [id number]                                                        ANALY DIXON

                             2018                                              

                          Active                            [facility]                    

 

                                                                            Outpatient                  

                    [id number]                                                        CRISTHIAN ALARCONH

                             2018                                              

                          Active                            [facility]                    

 

                                                                            Outpatient                  

                    [id number]                                                        CRISTHIAN [facility]

                             10/26/2018                                              

                          Active                            [facility]                    

 

                                                                            Outpatient                  

                    [id number]                                                        CECILE ARGUETA

                             2018                                              

                          Active                            [facility]                    

 

                                                                            Outpatient                  

                    [id number]                                                        KARRIE 

BUYS                             2018                                          

                          Active                            [facility]                    

 

                                                                            Outpatient                  

                    [id number]                                                        CECILE ARGUETA

                             2019                                              

                          Active                            [facility]                    



                                                                                
                                                                                
                                                                                
                                                                                
                                                            



Procedures

                    





                    Procedure                            Code                            Date           

                          Perfomer                            Comments                        

                                        Source                    

 

                    NJX INTERLAMINAR LMBR/SA                                                        2018

                                                                                     

[facility]                    

 

                    Cholecystectomy                            [id number]                                 

                                                                               [facility]                    

 

                    Biopsy of liver                            [id number]                                 

                                                                                [facility] 

[place]                    

 

                     section                            [id number]                                

                                                                                 [facility]

 [place]                    

 

                    Cholecystectomy                            [id number]                                 

                                                                                [facility] 

[place]                    

 

                    Colonoscopy                            [id number]                                     

                                                                             [facility] [place]

                    

 

                    Excision of cyst                            [id number]                               

                                                                                  [facility]

 [place]                    

 

                          Lumpectomy of breast                            [id number]                         

                                                                                                    

 [facility] [place]                    

 

                    Biopsy of liver                            [id number]                                 

                                                                               [facility]                    

 

                     section                            [id number]                                

                                                                                [facility]                    

 

                    Colonoscopy                            [id number]                                     

                                                                            [facility]

                    

 

                    Excision of cyst                            [id number]                               

                                                                                 [facility]                    

 

                          Lumpectomy of breast                            [id number]                         

                                                                                                    [facility]                    

 

                    Biopsy of liver                            [id number]                                 

                                                                                [facility] [place]

                    

 

                     section                            [id number]                                

                                                                                 [facility] 

[place]                    

 

                    Cholecystectomy                            [id number]                                 

                                                                                [facility] [place]

                    

 

                    Colonoscopy                            [id number]                                     

                                                                             [facility] [place]

                    

 

                    Excision of cyst                            [id number]                               

                                                                                  [facility]

 [place]                    

 

                          Lumpectomy of breast                            104904414                         

                                                                                                    

 [facility] [place]                    

 

                    Biopsy of liver                            [id number]                                 

                                                                               [facility]

 [facility] [place]                    

 

                     section                            [id number]                                

                                                                                [facility] [place]                    

 

                    Cholecystectomy                            [id number]                                 

                                                                               [facility]

 [facility] [place]                    

 

                    Colonoscopy                            [id number]                                     

                                                                            [facility]

 [facility] [place]                    

 

                    Excision of cyst                            [id number]                               

                                                                                 [facility]

 [facility]                    

 

                          Lumpectomy of breast                            [id number]                         

                                                                                                    [facility]                    

 

                    Biopsy of liver                            [id number]                                 

                                                                                [facility]

                    

 

                     section                            [id number]                                

                                                                                 [facility]

                    

 

                    Cholecystectomy                            [id number]                                 

                                                                                [facility]

                    

 

                    Colonoscopy                            [id number]                                     

                                                                             [facility]

                    

 

                    Excision of cyst                            [id number]                               

                                                                                  [facility]

                    

 

                          Lumpectomy of breast                            [id number]                         

                                                                                                    [facility]

## 2019-01-25 NOTE — XMS REPORT
Summary of Care: 18 - 18

                             Created on: 2102



ADITI JOSEPH

External Reference #: [id number]

: 1950

Sex: Female



Demographics







                          Address                   [address] [place] [address]  [address]

 

                          Home Phone                [phone]

 

                          Preferred Language        English

 

                          Marital Status            

 

                          Sikhism Affiliation     Unknown

 

                          Race                      Other

 

                                        Additional Race(s)  

 

                          Ethnic Group              /Latin





Author







                          Author                    [facility] ([place] [place]

 

                          Organization              [facility] [place]

 

                          Address                   Unknown

 

                          Phone                     Unavailable







Encounter





HQ Encntr_alias(FIN) [id number] Date(s): 18 - 18

[facility] [place] [address] [facility], [address] [place], [place] [address] 





Vital Signs





No data available for this section



Problem List







    



              Condition     Effective Dates     Status       Health Status     Informant

 

    



                           Simple                    Active  



                                         obesity(Confirmed)    







Allergies, Adverse Reactions, Alerts





No data available for this section



Medications





No data available for this section



Results





No data available for this section



Immunizations





No data available for this section



Procedures







    



              Procedure     Date         Related Diagnosis     Body Site     Status

 

    



                           Cholecystectomy           Completed







Social History







 



                           Social History Type       Response

 

 



                           Smoking Status            Never smoker; Exposure to Tobacco Smoke None; Cigarette Smoking

 Last 365



                                         Days No; Reg Smoking Cessation Counseling No



                                         entered on: 4/10/18







Assessment and Plan





No data available for this section

## 2019-01-25 NOTE — XMS REPORT
Summary of Care: 18 - 18

                             Created on: 10/05/2125



ADITI JOSEPH

External Reference #: [id number]

: 1950

Sex: Female



Demographics







                          [place]  [address]

 

                          Home Phone                [phone]

 

                          Preferred Language        English

 

                          Marital Status            

 

                          Confucianism Affiliation     Unknown

 

                          Race                      Other

 

                                        Additional Race(s)  

 

                          Ethnic Group              /Latin





Author







                          Author                    [facility]

 

                          Organization              [facility]

 

                          Address                   Unknown

 

                          Phone                     Unavailable







Encounter





HQ Encntr_alias(FIN) [id number] Date(s): 18 - 18

[facility] [address] [place], [place] [address] 

Discharge Disposition: Home or Self Care

Attending Physician: Cristhian Orr MD

Referring Physician: Marycruz Diamond DO





Vital Signs





No data available for this section



Problem List







    



              Condition     Effective Dates     Status       Health Status     Informant

 

    



                           Simple                    Active  



                                         obesity(Confirmed)    







Allergies, Adverse Reactions, Alerts





No data available for this section



Medications





gabapentin 300 mg oral capsule

See Instructions, PO, Take one cap PO at Bedtime x 3 nights, then 1 cap PO BID x
3 days, then 1 cap PO TID thereafter, # 90 cap, 0 Refill(s), Pharmacy: [id number]

Start Date: 18

Status: Ordered



Results





No data available for this section



Immunizations





No data available for this section



Procedures







    



              Procedure     Date         Related Diagnosis     Body Site     Status

 

    



                           Cholecystectomy           Completed







Social History







 



                           Social History Type       Response

 

 



                           Smoking Status            Never smoker; Exposure to Tobacco Smoke None; Cigarette Smoking

 Last 365



                                         Days No; Reg Smoking Cessation Counseling No



                                         entered on: 4/10/18







Assessment and Plan





No data available for this section

## 2019-01-25 NOTE — XMS REPORT
Summary of Care: 3/26/18 - 3/26/18

                             Created on: 10/18/2103



ADITI JOSEPH

External Reference #: [id number]

: 1950

Sex: Female



Demographics







                          Address                   [address] [place] [address]  [address]

 

                          Home Phone                [phone]

 

                          Preferred Language        English

 

                          Marital Status            

 

                          Christian Affiliation     None

 

                          Race                      Other

 

                                        Additional Race(s)  

 

                          Ethnic Group              Non-





Author







                          Author                    [facility]

 

                          Address                   Unknown

 

                          Phone                     Unavailable







Encounter





HQ Luda_jeny(FIN) [id number] Date(s): 3/26/18 - 3/26/18

[facility]

Attending Physician: Marycruz Diamond DO





Vital Signs





No data available for this section



Problem List







    



              Condition     Effective Dates     Status       Health Status     Informant

 

    



                           Diabetes(Confirmed)       Active  

 

    



                           Acid reflux               Active  



                                         disease(Confirmed)    

 

    



                           Hypertension(Confirm      Active  



                                         ed)    

 

    



                           Radiculopathy(Confir      Active  



                                         med)1    

 

    



                           Simple                    Active  



                                         obesity(Confirmed)    







1L3-4



Allergies, Adverse Reactions, Alerts







   



                 Substance       Reaction        Severity        Status

 

   



                           NKDA                      Active







Medications





No data available for this section



Results





No data available for this section



Immunizations





No data available for this section



Procedures







    



              Procedure     Date         Related Diagnosis     Body Site     Status

 

    



                           Biopsy of liver           Completed

 

    



                            section          Completed

 

    



                           Cholecystectomy           Completed

 

    



                           Colonoscopy               Completed

 

    



                           Excision of cyst          Completed

 

    



                           Lumpectomy of breast        Completed







Social History







 



                           Social History Type       Response

 

 



                           Smoking Status            Never smoker; Exposure to Tobacco Smoke None; Cigarette Smoking

 Last 365



                                         Days No; Reg Smoking Cessation Counseling No



                                         entered on: 18







Assessment and Plan





No data available for this section

## 2019-01-25 NOTE — XMS REPORT
Summary of Care: 17 - 17

                             Created on: 2114



ADITI JOSEPH

External Reference #: [id number]

: 1950

Sex: Female



Demographics







                          Address                   [address] [place] [address]  [address]

 

                          Home Phone                [phone]

 

                          Preferred Language        Unknown

 

                          Marital Status            Unknown

 

                          Anglican Affiliation     Unknown

 

                          Race                      White/

 

                          Ethnic Group              /Latin





Author







                          Author                    [facility]

 

                          Organization              [facility]

 

                          Address                   Unknown

 

                          Phone                     Unavailable







Encounter





HQ Encntr_alias(FIN) [id number] Date(s): 17 - 17

[facility] [address] [place] [address]-      [phone]
[phone]

Discharge Disposition: Home or Self Care

Attending Physician: Kirt Ohara MD





Vital Signs





No data available for this section



Problem List





No data available for this section



Allergies, Adverse Reactions, Alerts





No data available for this section



Medications





No data available for this section



Results





No data available for this section



Immunizations





No data available for this section



Procedures





No data available for this section



Social History





No data available for this section



Assessment and Plan





No data available for this section

## 2019-01-25 NOTE — XMS REPORT
Clinical Summary

                             Created on: 2019



Josette Ventura

External Reference #: [id number]

: 1950

Sex: Female



Demographics







                          Address                   [address] [place]  [address]

 

                          Home Phone                [phone]

 

                          Preferred Language        Unknown

 

                          Marital Status            Unknown

 

                          Restorationist Affiliation     M[employer]onism

 

                          Race                      White

 

                          Ethnic Group              /Latin





Author







                          Author                    MERVIN [facility]

 

                          Address                   Unknown

 

                          Phone                     Unavailable







Support







                Name            Relationship    Address         Phone

 

                    Josette Ventura    [employer]                [address] [place]  [address]                 [phone]

 

                [place]  [address]    [phone]







Care Team Providers







                    Care Team Member Name    Role                Phone

 

                    Santiago--Luis Enrique Baron    PCP                 [phone]







Allergies

No Known Allergies



Medications







                          End Date                  Status



              Medication     Sig          Dispensed     Refills      Start  



                                         Date  

 

                                                    Active



                 metFORMIN (GLUCOPHAGE-XR)     Take 500 mg      0                 



                     500 MG 24 hr tablet     by mouth            4  



                                         daily.     

 

                                                    Active



                 metoprolol (TOPROL-XL) 25     Take 25 mg by      0                 



                     MG 24 hr tablet     mouth daily.        4  

 

                                                    Active



                 loratadine (CLARITIN) 10     Take 10 mg by      0                 



                     mg tablet           mouth daily.        4  

 

                                                    Active



                     calcium carbonate-vitamin     Take 2              0   



                           D3 (CALCIUM 600 WITH      tablets by     



                           VITAMIN D3) 600           mouth daily.     



                                         mg(1,500mg) -500 unit Cap      

 

                                                    Active



                     omeprazole (PRILOSEC) 40     Take 40 mg by       0   



                           MG capsule                mouth daily     



                                         as needed .     

 

                                                    Active



                     lactulose (CHRONULAC) 10     Take 20 g by        0   



                           gram/15 mL (15 mL)        mouth 2 (two)     



                           solution                  times daily.     

 

                          2019                Active



              azaTHIOprine (IMURAN) 50     Take 1 tablet     90 tablet     2              



                     mg tabletIndications:     (50 mg total)       8  



                           Metabolic syndrome,       by mouth     



                           Immunity status testing,     daily.     



                                         Cirrhosis of liver      



                                         without ascites,      



                                         unspecified hepatic      



                                         cirrhosis type (HCC),      



                                         Autoimmune hepatitis      



                                         (HCC), Cancer screening      

 

                                                    Active



              rifAXIMin 550 mg     Take 1 tablet     60 tablet     11           10/15/201  



                     TabIndications: Cirrhosis     (550 mg             8  



                           of liver without ascites,     total) by     



                           unspecified hepatic       mouth 2 (two)     



                           cirrhosis type (HCC),     times daily.     



                                         Portal hypertension      



                                         (HCC), Hepatic      



                                         encephalopathy (HCC),      



                                         Secondary esophageal      



                                         varices without bleeding      



                                         (HCC), Autoimmune      



                                         hepatitis (HCC), Other      



                                         fatigue, Weight gain,      



                                         Immunity status testing      

 

                                                    Active



                     losartan (COZAAR) 25 MG       0                   10/12/201  



                           tabletIndications:        8  



                                         Cirrhosis of liver      



                                         without ascites,      



                                         unspecified hepatic      



                                         cirrhosis type (HCC),      



                                         Portal hypertension      



                                         (HCC), Hepatic      



                                         encephalopathy (HCC),      



                                         Secondary esophageal      



                                         varices without bleeding      



                                         (HCC), Autoimmune      



                                         hepatitis (HCC), Other      



                                         fatigue, Weight gain,      



                                         Immunity status testing      

 

                                                    Active



                 hydroxychloroquine     400 mg=2 tab,      0                 



                     (PLAQUENIL) 200 mg     PO,BID , # 60       8  



                           tabletIndications:        tab, 0     



                           Cirrhosis of liver        Refill(s)     



                                         without ascites,      



                                         unspecified hepatic      



                                         cirrhosis type (HCC),      



                                         Portal hypertension      



                                         (HCC), Hepatic      



                                         encephalopathy (HCC),      



                                         Secondary esophageal      



                                         varices without bleeding      



                                         (HCC), Autoimmune      



                                         hepatitis (HCC), Other      



                                         fatigue, Weight gain,      



                                         Immunity status testing      

 

                          10/15/2019                Active



              spironolactone     Take 1 tablet     30 tablet     5            10/15/201  



                     (ALDACTONE) 50 MG     (50 mg total)       8  



                           tabletIndications:        by mouth     



                           Cirrhosis of liver        daily.     



                                         without ascites,      



                                         unspecified hepatic      



                                         cirrhosis type (HCC),      



                                         Portal hypertension      



                                         (HCC), Hepatic      



                                         encephalopathy (HCC),      



                                         Secondary esophageal      



                                         varices without bleeding      



                                         (HCC), Autoimmune      



                                         hepatitis (HCC), Other      



                                         fatigue, Weight gain,      



                                         Immunity status testing      

 

                          10/15/2019                Active



              furosemide (LASIX) 20 MG     Take 1 tablet     30 tablet     5            10/15/201  



                     tabletIndications:     (20 mg total)       8  



                           Cirrhosis of liver        by mouth     



                           without ascites,          daily.     



                                         unspecified hepatic      



                                         cirrhosis type (HCC),      



                                         Portal hypertension      



                                         (HCC), Hepatic      



                                         encephalopathy (HCC),      



                                         Secondary esophageal      



                                         varices without bleeding      



                                         (HCC), Autoimmune      



                                         hepatitis (HCC), Other      



                                         fatigue, Weight gain,      



                                         Immunity status testing      

 

                                                    Active



              rifAXIMin 550 mg     Take 1 tablet     60 tablet     11           10/15/201  



                     TabIndications: Cirrhosis     (550 mg             8  



                           of liver without ascites,     total) by     



                           unspecified hepatic       mouth 2 (two)     



                           cirrhosis type (HCC),     times daily.     



                                         Portal hypertension      



                                         (HCC), Hepatic      



                                         encephalopathy (HCC),      



                                         Secondary esophageal      



                                         varices without bleeding      



                                         (HCC), Autoimmune      



                                         hepatitis (HCC), Other      



                                         fatigue, Weight gain,      



                                         Immunity status testing      

 

                          10/15/2018                Discontinued



                 lisinopril      Take 5 mg by      0               04/10/201  



                     (PRINIVIL,ZESTRIL) 5 MG     mouth daily.        5  



                                         tablet      

 

                          10/15/2018                Discontinued



                     lactulose (CEPHULAC) 10     Take 10 g by        0   



                           gram packet               mouth 2 (two)     



                                         times daily.     

 

                          10/15/2018                Discontinued



                     rifAXIMin 550 mg Tab     Take 550 mg         0   



                                         by mouth 2     



                                         (two) times     



                                         daily.     

 

                          2018                Discontinued



              azaTHIOprine (IMURAN) 50     Take 1 tablet     90 tablet     1              



                     mg tabletIndications:     (50 mg total)       7  



                           Metabolic syndrome,       by mouth     



                           Immunity status testing,     daily.     



                                         Cirrhosis of liver      



                                         without ascites,      



                                         unspecified hepatic      



                                         cirrhosis type (HCC)      







Active Problems







 



                           Problem                   Noted Date

 

 



                           Autoimmune hepatitis      2018

 

 



                           MEJIA (nonalcoholic steatohepatitis)     2018

 

 



                           Cirrhosis                 04/15/2017

 

 



                                         Last Assessment & Plan:



                                         Diagnosed by liver biopsy in .  Her cirrhosis is decompensated with



                                         hepatic encephalopathy. Etiology of her cirrhosis is uncertain.  Possible



                                         differentials are non-alcoholic fatty liver disease vs autoimmune



                                         hepatitis.  Liver biopsies in  and  showed chronic portal



                                         inflammation which is not typical of fatty liver disease.  It also showed



                                         steatosis.  We will obtain her liver biopsy slides and review at the



                                         pathology conference to determine the etiology.  Her MELD-Na is 9 which is



                                         early for OLT evaluation.

 

 



                           Portal hypertension       04/15/2017

 

 



                                         Last Assessment & Plan:



                                         Manifested by splenomegaly and hepatic encephalopathy.

 

 



                           Hepatic encephalopathy     04/15/2017

 

 



                                         Last Assessment & Plan:



                                         Grade 0-1.  Well controlled on lactulose and rifaximin.  Lactulose to



                                         titrate to 2-3 BMs a day.

 

 



                           Varices, esophageal       04/15/2017

 

 



                                         Last Assessment & Plan:



                                         EGD on 2016 showed no varices.  Follow-up with Dr. Ohara for repeat in



                                         .

 

 



                           Metabolic syndrome        04/15/2017

 

 



                                         Last Assessment & Plan:



                                         She meets criteria for metabolic syndrome including obesity, diabetes and



                                         hypertriglyceridemia.  Metabolic syndrome is a known risk factor of



                                         non-alcoholic fatty liver disease.  We recommend at least 10% weight loss



                                         and better control of diabetes and hypertriglyceridemia.

 

 



                           Immunity status testing     04/15/2017

 

 



                                         Last Assessment & Plan:



                                         CDC recommends that all patients with chronic liver disease, regardless of



                                         etiology, should be immunized to prevent hepatitis A and hepatitis B if



                                         they are not already immune.  This should be done in addition to other



                                         age-appropriate vaccines.  She is immune to hepatitis A but not against



                                         hepatitis B and so we recommend 3 series vaccination for hepatitis B.

 

 



                           Screening for malignant neoplasm     04/15/2017

 

 



                                         Last Assessment & Plan:



                                         Cirrhosis, regardless of etiology, is a risk factor for hepatocellular



                                         carcinoma (HCC), with an annual incidence of 1.5-7%. We recommend



                                         surveillance for HCC with abdominal imaging and alphafetoprotein every 6



                                         months.  US on 17 was negative for any suspicious masses.  We will



                                         order an MRI for better characterization to rule out suspicious masses due



                                         to long history of cirrhosis since  and increased risk of HCC.  We will



                                         check AFP today.







Encounters







                          Care Team                 Description



                     Date                Type                Specialty  

 

                                        



Melanie Siegel PA-C            Medication Dose Change



                     10/29/2018          Telephone           Melanie Evans PA-C             



                     10/28/2018          Orders Only         HepatKendal Jimenez RN Xifaxan



                     10/23/2018          Telephone           Kendal Pozo RN                     



                     10/19/2018          Abstract            HepatKendal Jimenez RN                     



                     10/17/2018          Abstract            Hepatology  

 

                                        



Mark Atwood MD Diller, Karen Cardwell, PA-C            Cirrhosis of liver without ascites, unspecified hepatic

 cirrhosis type (HCC) (Primary Dx); 

Portal hypertension ; 

Hepatic encephalopathy ; 

Secondary esophageal varices without bleeding (HCC); 

Autoimmune hepatitis (HCC); 

Other fatigue; 

Weight gain; 

Immunity status testing; 

Screening for cancer; 

Pancreatic lesion



                     10/15/2018          Office Visit        Hepatology  

 

                                        



Mark Atwood MD                      Cirrhosis of liver without ascites, unspecified hepatic cirrhosis

 type (HCC)



                     10/08/2018          Hospital            Radiology  



                                         Encounter   

 

                                        



Counts, JAY Yepez               Cirrhosis of liver without ascites, unspecified hepatic

 cirrhosis type (HCC) (Primary Dx)



                     10/03/2018          Orders Only         HepatKendal Jimenez RN                    Cirrhosis of liver without ascites, unspecified hepatic cirrhosis

 type (HCC) (Primary Dx)



                     10/03/2018          Orders Only         Kendal Pozo RN                    Cirrhosis of liver without ascites, unspecified hepatic cirrhosis

 type (HCC) (Primary Dx)



                     2018          Orders Only         Mark Damico MD Diller, Karen Cardwell, PA-C            Cirrhosis of liver without ascites, unspecified hepatic

 cirrhosis type (HCC) (Primary Dx); 

Metabolic syndrome; 

Immunity status testing; 

Autoimmune hepatitis (HCC); 

Cancer screening; 

MEJIA (nonalcoholic steatohepatitis)



                     2018          Office Visit        Hepatology  

 

                                        



Mark Atwood MD                      Cirrhosis of liver without ascites, unspecified hepatic cirrhosis

 type (HCC)



                     2018          Orders Only         Lab  

 

                                        



Mark Atwood MD                      Cirrhosis of liver without ascites, unspecified hepatic cirrhosis

 type (HCC)



                     2018          Hospital            Radiology  



                                         Encounter   

 

                                        



Mark Atwood MD                       



                     2018          Outside Orders      Central Scheduling  

 

                                        



Kendal Lemons RN                    Cirrhosis of liver without ascites, unspecified hepatic cirrhosis

 type (HCC) (Primary Dx)



                     2018          Orders Only         Hepatology  



after 2018



Family History







   



                 Medical History     Relation        Name            Comments

 

   



                           Hypertension              Father  

 

   



                           Kidney disease            Father  

 

   



                           Diabetes                  Mother  

 

   



                           Hypertension              Mother  

 

   



                           Kidney disease            Mother  

 

   



                           Diabetes                  Sister  

 

   



                           Tuberculosis              Sister  









   



                 Relation        Name            Status          Comments

 

   



                           Father                     

 

   



                           Mother                     

 

   



                           Sister                     







Social History







                                        Date



                 Tobacco Use     Types           Packs/Day       Years Used 

 

                                         



                                         Never Smoker    









   



                 Alcohol Use     Drinks/Week     oz/Week         Comments

 

   



                                         No   









 



                           Sex Assigned at Birth     Date Recorded

 

 



                                         Not on file 









                                        Industry



                           Job Start Date            Occupation 

 

                                        Not on file



                           Not on file               Not on file 









                                        Travel End



                           Travel History            Travel Start 

 





                                         No recent travel history available.







Last Filed Vital Signs







                                        Time Taken



                           Vital Sign                Reading 

 

                                        10/15/2018  2:25 PM CDT



                           Blood Pressure            133/79 

 

                                        10/15/2018  2:25 PM CDT



                           Pulse                     74 

 

                                        10/15/2018  2:25 PM CDT



                           Temperature               36.5 C (97.7 F) 

 

                                        10/15/2018  2:25 PM CDT



                           Respiratory Rate          20 

 

                                        10/15/2018  2:25 PM CDT



                           Oxygen Saturation         98% 

 

                                        -



                           Inhaled Oxygen            - 



                                         Concentration  

 

                                        10/15/2018  2:25 PM CDT



                           Weight                    81.9 kg (180 lb 8 oz) 

 

                                        10/15/2018  2:25 PM CDT



                           Height                    154.9 cm (5' 1") 

 

                                        10/15/2018  2:25 PM CDT



                           Body Mass Index           34.11 







Plan of Treatment







                          Care Team                 Description



                     Date                Type                Specialty  

 

                                        



Resource, [facility]       



                     2019          Office Visit        Hepatology  









   



                 Health Maintenance     Due Date        Last Done       Comments

 

   



                           INFLUENZA VACCINE         10/01/2018  







Procedures







                                        Comments



                 Procedure Name     Priority        Date/Time       Associated Diagnosis 

 

                                        



Results for this procedure are in the results section.



                 IMMUNOGLOBULIN G (IGG)     Routine         10/15/2018      Cirrhosis of liver 



                           4:07 PM CDT               without ascites, 



                                         unspecified hepatic 



                                         cirrhosis type (HCC) 



                                         Portal hypertension 



                                         Hepatic encephalopathy 



                                         Secondary esophageal 



                                         varices without bleeding 



                                         (HCC) 



                                         Autoimmune hepatitis 



                                         (HCC) 



                                         Other fatigue 



                                         Weight gain 



                                         Immunity status testing 

 

                                        



Results for this procedure are in the results section.



                 HEPATITIS B SURFACE     Routine         10/15/2018      Cirrhosis of liver 



                     ANTIBODY            4:07 PM CDT         without ascites, 



                                         unspecified hepatic 



                                         cirrhosis type (HCC) 



                                         Portal hypertension 



                                         Hepatic encephalopathy 



                                         Secondary esophageal 



                                         varices without bleeding 



                                         (HCC) 



                                         Autoimmune hepatitis 



                                         (HCC) 



                                         Other fatigue 



                                         Weight gain 



                                         Immunity status testing 

 

                                        



Results for this procedure are in the results section.



                 TSH             Routine         10/15/2018      Cirrhosis of liver 



                           4:07 PM CDT               without ascites, 



                                         unspecified hepatic 



                                         cirrhosis type (HCC) 



                                         Portal hypertension 



                                         Hepatic encephalopathy 



                                         Secondary esophageal 



                                         varices without bleeding 



                                         (HCC) 



                                         Autoimmune hepatitis 



                                         (HCC) 



                                         Other fatigue 



                                         Weight gain 



                                         Immunity status testing 

 

                                        



Results for this procedure are in the results section.



                 MR ABDOMEN WITH/WITHOUT     Routine         10/08/2018      Cirrhosis of liver 



                     IV CONTRAST         11:04 AM CDT        without ascites, 



                                         unspecified hepatic 



                                         cirrhosis type (HCC) 

 

                                        



Results for this procedure are in the results section.



                     PROTHROMBIN TIME/INR     Routine             10/05/2018  



                                         8:49 AM CDT  

 

                                        



Results for this procedure are in the results section.



                     HEPATIC FUNCTION PANEL     Routine             10/05/2018  



                                         8:49 AM CDT  

 

                                        



Results for this procedure are in the results section.



                     BASIC METABOLIC PANEL (7)     Routine             10/05/2018  



                                         8:49 AM CDT  

 

                                        



Results for this procedure are in the results section.



                     CBC W/PLT COUNT & AUTO     Routine             10/05/2018  



                           DIFFERENTIAL              8:49 AM CDT  

 

                                        



Results for this procedure are in the results section.



                 ALPHA FETOPROTEIN (AFP),     Routine         10/05/2018      Cirrhosis of liver 



                     TUMOR MARKER        8:49 AM CDT         without ascites, 



                                         unspecified hepatic 



                                         cirrhosis type (HCC) 



                                         Metabolic syndrome 



                                         Immunity status testing 



                                         Autoimmune hepatitis 



                                         (HCC) 



                                         Cancer screening 

 

                                        



Results for this procedure are in the results section.



                 PROTHROMBIN TIME/INR     Routine         2018      Cirrhosis of liver 



                           12:00 AM CDT              without ascites, 



                                         unspecified hepatic 



                                         cirrhosis type (HCC) 



                                         Metabolic syndrome 



                                         Immunity status testing 



                                         Autoimmune hepatitis 



                                         (HCC) 



                                         Cancer screening 

 

                                        



Results for this procedure are in the results section.



                 CBC W/PLT COUNT & AUTO     Routine         2018      Cirrhosis of liver 



                     DIFFERENTIAL        12:00 AM CDT        without ascites, 



                                         unspecified hepatic 



                                         cirrhosis type (HCC) 



                                         Metabolic syndrome 



                                         Immunity status testing 



                                         Autoimmune hepatitis 



                                         (HCC) 



                                         Cancer screening 

 

                                        



Results for this procedure are in the results section.



                 HEPATIC FUNCTION PANEL     Routine         2018      Cirrhosis of liver 



                           12:00 AM CDT              without ascites, 



                                         unspecified hepatic 



                                         cirrhosis type (HCC) 



                                         Metabolic syndrome 



                                         Immunity status testing 



                                         Autoimmune hepatitis 



                                         (HCC) 



                                         Cancer screening 

 

                                        



Results for this procedure are in the results section.



                 BASIC METABOLIC PANEL (7)     Routine         2018      Cirrhosis of liver 



                           12:00 AM CDT              without ascites, 



                                         unspecified hepatic 



                                         cirrhosis type (HCC) 



                                         Metabolic syndrome 



                                         Immunity status testing 



                                         Autoimmune hepatitis 



                                         (HCC) 



                                         Cancer screening 

 

                                        



Results for this procedure are in the results section.



                 PROTHROMBIN TIME/INR     Routine         2018      Cirrhosis of liver 



                           3:19 PM CDT               without ascites, 



                                         unspecified hepatic 



                                         cirrhosis type (HCC) 



                                         Metabolic syndrome 



                                         Immunity status testing 



                                         Autoimmune hepatitis 



                                         (HCC) 



                                         Cancer screening 

 

                                        



Results for this procedure are in the results section.



                 CBC W/PLT COUNT & AUTO     Routine         2018      Cirrhosis of liver 



                     DIFFERENTIAL        12:13 PM CDT        without ascites, 



                                         unspecified hepatic 



                                         cirrhosis type (HCC) 

 

                                        



Results for this procedure are in the results section.



                 ALPHA FETOPROTEIN (AFP),     Routine         2018      Cirrhosis of liver 



                     TUMOR MARKER        12:13 PM CDT        without ascites, 



                                         unspecified hepatic 



                                         cirrhosis type (HCC) 

 

                                        



Results for this procedure are in the results section.



                 CBC W/PLT COUNT & AUTO     Routine         2018      Cirrhosis of liver 



                     DIFFERENTIAL        12:13 PM CDT        without ascites, 



                                         unspecified hepatic 



                                         cirrhosis type (HCC) 

 

                                        



Results for this procedure are in the results section.



                 HEPATIC FUNCTION PANEL     Routine         2018      Cirrhosis of liver 



                           12:13 PM CDT              without ascites, 



                                         unspecified hepatic 



                                         cirrhosis type (HCC) 

 

                                        



Results for this procedure are in the results section.



                 BASIC METABOLIC PANEL (7)     Routine         2018      Cirrhosis of liver 



                           12:13 PM CDT              without ascites, 



                                         unspecified hepatic 



                                         cirrhosis type (HCC) 

 

                                        



Results for this procedure are in the results section.



                 MR ABDOMEN WITH/WITHOUT     Routine         2018      Cirrhosis of liver 



                     IV CONTRAST         11:38 AM CDT        without ascites, 



                                         unspecified hepatic 



                                         cirrhosis type (HCC) 

 

                                        



Results for this procedure are in the results section.



                     POCT-CREATININE     Routine             2018  



                                         11:18 AM CDT  



after 2018



Results

* Hepatitis B surface antibody (10/15/2018  4:07 PM CDT)





   



                 Component       Value           Ref Range       Performed At

 

   



                 Hep B S Ab      <8.0            <8.0 mIU/mL     [facility]













                                         Specimen

 





                                         Blood









   



                 Performing Organization     Address         City/State/Zipcode     Phone Number

 

   



                 [facility]     [address] [place] [address] [phone]





                                         [facility]   





* TSH (10/15/2018  4:07 PM CDT)





   



                 Component       Value           Ref Range       Performed At

 

   



                 TSH             1.32            0.35 - 4.94 uIU/mL     [facility]













                                         Specimen

 





                                         Blood









   



                 Performing Organization     Address         City/State/Zipcode     Phone Number

 

   



                 [facility]     [address], [place] [id number] [phone]





                                         [facility]   





* Immunoglobulin G (IgG) (10/15/2018  4:07 PM CDT)





   



                 Component       Value           Ref Range       Performed At

 

   



                 IgG             2,199 (H)       540 - 1,822 mg/dL     [facility]













                                         Specimen

 





                                         Blood









   



                 Performing Organization     Address         City/State/Zipcode     Phone Number

 

   



                 [facility]     [address], [place] [id number] [phone]





                                         [facility]   





* MR abdomen with/without IV contrast (10/08/2018 11:04 AM CDT)



Only the most recent of 2 results within the time period is included.





 



                           Narrative                 Performed At

 

 



                           FINAL REPORT              [facility]



                                         PATIENT ID: [id number] 



                                         MRI of the abdomen dated 2018 



                                         COMPARISON: 2018 



                                         Comment: Multiplanar T1 and T2-weighted images of the abdomen, 



                                         postcontrast axial and coronal T1-weighted images of the abdomen were 



                                         obtained. 



                                         Liver is cirrhotic in appearance with the ureter margins. No abnormal 



                                         enhancement or suspicious mass is seen in the liver. Spleen is in 



                                         upper limits of normal in size. 



                                         The splenic, superior mesenteric, portal, and hepatic veins are 



                                         patent. Main portal vein measures approximately 10 mm in diameter. 



                                         Paraesophageal varices is present. 



                                         Pancreas is normal in caliber. A 4 mm cyst is seen in the tail of the 



                                         pancreas. No pancreatic duct dilatation or enhancing pancreatic mass 



                                         is noted. 



                                         Both adrenals unremarkable. Kidneys are normal in size and 



                                         functioning. 



                                         No mass, adenopathy, ascites is seen in the abdomen. 



                                         The visualized small and large bowel are unremarkable. 



                                         IMPRESSION: 



                                         1. Cirrhosis with splenomegaly and portal hypertension. 



                                         2. No suspicious hepatic mass. 



                                         3. Small nonspecific cyst in the tail of the pancreas. 



                                         Signed: Isidra Hurtado MD 



                                         Report Verified Date/Time:10/08/2018 15:20:04 



                                         Reading Location: [address] Radiology Reading Room 



                                         Electronically signed by: ISIDRA HURTADO M.D. on 10/08/2018 03:20 PM

 









                                        Procedure Note

 

                                        



Interface, External Ris In - 10/08/2018  3:22 PM CDT



FINAL REPORT

 

PATIENT ID:   [id number]

 

MRI of the abdomen dated 2018

 

COMPARISON: 2018

 

Comment: Multiplanar T1 and T2-weighted images of the abdomen, 

postcontrast axial and coronal T1-weighted images of the abdomen were 

obtained.

 

Liver is cirrhotic in appearance with the ureter margins. No abnormal 

enhancement or suspicious mass is seen in the liver. Spleen is in 

upper limits of normal in size.

 

The splenic, superior mesenteric, portal, and hepatic veins are 

patent. Main portal vein measures approximately 10 mm in diameter. 

Paraesophageal varices is present.

 

Pancreas is normal in caliber. A 4 mm cyst is seen in the tail of the 

pancreas. No pancreatic duct dilatation or enhancing pancreatic mass 

is noted.

 

Both adrenals unremarkable. Kidneys are normal in size and 

functioning.

 

No mass, adenopathy, ascites is seen in the abdomen.

 

The visualized small and large bowel are unremarkable.

 

IMPRESSION:

                                        1. Cirrhosis with splenomegaly and portal hypertension.

                                        2. No suspicious hepatic mass.

                                        3. Small nonspecific cyst in the tail of the pancreas.

 

Signed: Isidra Hurtado MD

Report Verified Date/Time:  10/08/2018 15:20:04

 

Reading Location: [address] Radiology Reading Room

      Electronically signed by: ISIDRA HURTADO M.D. on 10/08/2018 03:20 PM

 









   



                 Performing Organization     Address         City[facility]     Phone Number

 

   



                                         [facility]   





* Alpha fetoprotein (AFP), tumor marker (10/05/2018  8:49 AM CDT)



Only the most recent of 2 results within the time period is included.





   



                 Component       Value           Ref Range       Performed At

 

   



                 AFP, Serum, Tumor Marker     5.2Comment: [employer] ECLIA     0.0 - 8.3 ng/mL     [facility] 

1



                                         methodology  













                                         Specimen

 





                                         Blood









 



                           Narrative                 Performed At

 

 



                           Performed at: [facility]     [facility]



                                         [address], [id number] 



                                         : Ruben Sinclair MD, Phone:[phone] 









   



                 Performing Organization     Address         City/[facility]     Phone Number

 

   



                                         [facility]   

 

   



                                         [facility]   





* Prothrombin time/INR (10/05/2018  8:49 AM CDT)



Only the most recent of 3 results within the time period is included.





   



                 Component       Value           Ref Range       Performed At

 

   



                 INR             1.2             0.8 - 1.2       [facility]



                                         Comment:  



                                         Reference interval is for  



                                         non-anticoagulated patients.  



                                         Suggested INR therapeutic  



                                         range for Vitamin K  



                                         antagonist therapy:  



                                          Standard Dose (moderate  



                                         intensity  



                                           



                                         therapeutic  



                                         range): 2.0 - 3.0  



                                          Higher intensity  



                                         therapeutic range  



                                         2.5 - 3.5  

 

   



                 Prothrombin Time     12.8 (H)        9.1 - 12.0 sec     [facility]









 



                           Narrative                 Performed At

 

 



                           Performed at:01 - [facility]     [facility]



                                         [address] [facility], [id number] 



                                         : Ruben Sinclair MD, Phone:[phone] 









   



                 Performing Organization     Address         City/State/Zipcode     Phone Number

 

   



                                         [facility]   

 

   



                                         [facility]   





* CBC with platelet count + automated diff (10/05/2018  8:49 AM CDT)



Only the most recent of 2 results within the time period is included.





   



                 Component       Value           Ref Range       Performed At

 

   



                 WBC             2.5 (LL)        3.4 - 10.8 x10E3/uL     [facility]

 

   



                 RBC             3.84            3.77 - 5.28 x10E6/uL     [facility]

 

   



                 Hemoglobin      13.2            11.1 - 15.9 g/dL     [facility]

 

   



                 Hematocrit      37.9            34.0 - 46.6 %     [facility]

 

   



                 MCV             99 (H)          79 - 97 fL      [facility]

 

   



                 MCH             34.4 (H)        26.6 - 33.0 pg     [facility]

 

   



                 MCHC            34.8            31.5 - 35.7 g/dL     [facility]

 

   



                 RDW             14.8            12.3 - 15.4 %     [facility]

 

   



                 Platelets       77 (LL)         150 - 379 x10E3/uL     [facility]



                                         Comment:  



                                         Platelet count verified by  



                                         examination of peripheral  



                                         blood smear.  



                                         Decreased.  

 

   



                 % Neutros       51              Not Estab. %     [facility]

 

   



                 % Lymphs        30              Not Estab. %     [facility]

 

   



                 % Monos         16              Not Estab. %     [facility]

 

   



                 % Eos           2               Not Estab. %     [facility]

 

   



                 % Baso          1               Not Estab. %     [facility]

 

   



                 # Neutros       1.3 (L)         1.4 - 7.0 x10E3/uL     [facility]

 

   



                 # Lymphs        0.8             0.7 - 3.1 x10E3/uL     [facility]

 

   



                 # Monos         0.4             0.1 - 0.9 x10E3/uL     [facility]

 

   



                 # Eos           0.1             0.0 - 0.4 x10E3/uL     [facility]

 

   



                 Baso (Absolute)     0.0             0.0 - 0.2 x10E3/uL     [facility]

 

   



                 % Immature Grans     0               Not Estab. %     [facility]

 

   



                 # Immature Grans     0.0             0.0 - 0.1 x10E3/uL     [facility]

 

   



                     Hematology Comments:     Note:Comment: Verified by      [facility]



                                         microscopic examination.  









 



                           Narrative                 Performed At

 

 



                           [id number] 



                                         : Ruben Sinclair MD, Phone:[phone] 









   



                 Performing Organization     Address         City/[facility]     Phone Number

 

   



                                         [facility]   

 

   



                                         [facility]   





* Hepatic function panel (10/05/2018  8:49 AM CDT)



Only the most recent of 3 results within the time period is included.





   



                 Component       Value           Ref Range       Performed At

 

   



                 Protein, Total, Serum     7.3             6.0 - 8.5 g/dL     [facility]

 

   



                 Albumin, Serum     3.3 (L)         3.6 - 4.8 g/dL     [facility]

 

   



                 Bilirubin, Total     0.8             0.0 - 1.2 mg/dL     [facility]

 

   



                 Bilirubin, Direct     0.26            0.00 - 0.40 mg/dL     [facility]

 

   



                 Alkaline Phosphatase, S     116             39 - 117 IU/L     [facility]

 

   



                 AST (SGOT)      51 (H)          0 - 40 IU/L     [facility]

 

   



                 ALT (SGPT)      30              0 - 32 IU/L     [facility]









 



                           Narrative                 Performed At

 

 



                           Performed at: [id number] 



                                         : Ruben Sinclair MD, Phone:[phone] 









   



                 Performing Organization     Address         City/[facility]     Phone Number

 

   



                                         [facility]   

 

   



                                         [facility]   





* Basic Metabolic Panel (10/05/2018  8:49 AM CDT)



Only the most recent of 3 results within the time period is included.





   



                 Component       Value           Ref Range       Performed At

 

   



                 Glucose, Serum     136 (H)         65 - 99 mg/dL     [facility]

 

   



                 BUN             18              8 - 27 mg/dL     [facility]

 

   



                 Creatinine, Serum     0.53 (L)        0.57 - 1.00 mg/dL     [facility]

 

   



                 eGFR If NonAfricn Am     100             >59 mL/min/1.73     [facility]

 

   



                 eGFR If Africn Am     115             >59 mL/min/1.73     [facility]

 

   



                 BUN/Creatinine Ratio     34 (H)          12 - 28         [facility]

 

   



                 Sodium, Serum     142             134 - 144 mmol/L     [facility]

 

   



                 Potassium, Serum     4.2             3.5 - 5.2 mmol/L     [facility]

 

   



                 Chloride, Serum     105             96 - 106 mmol/L     [facility]

 

   



                 Carbon Dioxide, Total     24              20 - 29 mmol/L     [facility]

 

   



                 Calcium, Serum     8.9             8.7 - 10.3 mg/dL     [facility]









 



                           Narrative                 Performed At

 

 



                           Performed at:01 - [facility]     [facility]



                                         [address], [id number] 



                                         : Ruben Sinclair MD, Phone:[phone] 









   



                 Performing Organization     Address         City/State/Zipcode     Phone Number

 

   



                                         [facility]   

 

   



                                         [facility]   





* CBC with platelet count + automated diff (2018 12:13 PM CDT)





   



                 Component       Value           Ref Range       Performed At

 

   



                 WBC             3.5             3.5 - 10.5 K/L     [facility]

 

   



                 RBC             4.02            3.93 - 5.22 M/L     [facility]

 

   



                 Hemoglobin      13.1            11.2 - 15.7 GM/DL     [facility]

 

   



                 Hematocrit      39.1            34.1 - 44.9 %     [facility]

 

   



                 MCV             97.3 (H)        79.4 - 94.8 fL     [facility]

 

   



                 MCH             32.6 (H)        25.6 - 32.2 pg     [facility]

 

   



                 MCHC            33.5            32.2 - 35.5 GM/DL     [facility]

 

   



                 RDW             13.2            11.7 - 14.4 %     [facility]

 

   



                 Platelets       78 (L)          150 - 450 K/CU MM     [facility]

 

   



                 MPV             12.2            9.4 - 12.3 fL     [facility]

 

   



                 nRBC            0               0 - 0 /100 WBC     [facility]

 

   



                 % Neutros       51              %               [facility]

 

   



                 % Lymphs        35              %               [facility]

 

   



                 % Monos         12              %               [facility]

 

   



                 % Eos           1               %               [facility]

 

   



                 % Baso          1               %               [facility]

 

   



                 # Neutros       1.79            1.56 - 6.13 K/L     [facility]

 

   



                 # Lymphs        1.21            1.18 - 3.74 K/L     [facility]

 

   



                 # Monos         0.43 (H)        0.24 - 0.36 K/L     [facility]

 

   



                 # Eos           0.05            0.04 - 0.36 K/L     [facility]

 

   



                 # Baso          0.02            0.01 - 0.08 K/L     [facility]

 

   



                 Immature        0               0 - 1 %         



                           Granulocytes-Relative       [facility]













                                         Specimen

 





                                         Blood









   



                 Performing Organization     Address         City/[facility]/Zipcode     Phone Number

 

   



                 [address] [address] [phone]





                                         [facility]   





* POC-Creatinine (2018 11:18 AM CDT)





   



                 Component       Value           Ref Range       Performed At

 

   



                 POC-Creatinine     0.5 (L)Comment: TESTED AT     0.6 - 1.3 mg/dL     [address]



                                         [id number]  

 

   



                 POC-EGFR        124             mL/min/1.73M2     [facility]













                                         Specimen

 





                                         Blood









   



                 Performing Organization     Address         City/[facility]/Zipcode     Phone Number

 

   



                 [address] [address] [phone]





                                         MEDICAL CENTER   





after 2018



Insurance







     



            Payer      Benefit     Subscriber ID     Type       Phone      Address



                                         Plan /    



                                         Group    

 

     



                     [facility]     [employer]MEDIC          xxxxxxxxx   



                           MEDICARE MGD CARE         ARE    



                                         COMPLETE    









     



            Guarantor Name     Account     Relation to     Date of     Phone      Billing Address



                     Type                Patient             Birth  

 

     



            Josette Ventura     Personal/F     Self       1950     [phone]     [address]               (Home)              [place] [address]







Advance Directives





For more information, please contact:



[address] [address] [phone]









                          Date Inactivated          Comments



                           Code Status               Date Activated  

 

                          2017  8:28 PM          



                           Full Code                 2017 11:39 AM  









  



                           This code status was determined by:     Patient

## 2019-01-25 NOTE — XMS REPORT
Summary of Care: 3/13/18 - 3/13/18

                             Created on: 10/27/2101



ADITI JOSEPH

External Reference #: [id number]

: 1950

Sex: Female



Demographics







                          Address                   [address] [place] [address]  [address]

 

                          Home Phone                [phone]

 

                          Preferred Language        English

 

                          Marital Status            

 

                          Amish Affiliation     None

 

                          Race                      Other

 

                                        Additional Race(s)  

 

                          Ethnic Group              /Latin





Author







                          Author                    [facility]

 

                          Organization              [facility]

 

                          Address                   Unknown

 

                          Phone                     Unavailable







Encounter





HQ Andria(FIN) [id number] Date(s): 3/13/18 - 3/13/18

[facility] [address] [place], [place] [address] [phone]
[phone]

Encounter Diagnosis

Spondylosis without myelopathy or radiculopathy, lumbar region (Final) - 3/17/18

Low back pain (Final) - 

Spinal stenosis, lumbar region without neurogenic claudication (Final) - 

Other specific arthropathies, not elsewhere classified, other specified site 
(Final) - 

Spondylolisthesis, lumbosacral region (Final) - 

Localized edema (Final) - 

Discharge Disposition: Home or Self Care

Attending Physician: Cristhian Orr MD





Vital Signs





No data available for this section



Problem List







    



              Condition     Effective Dates     Status       Health Status     Informant

 

    



                           Diabetes(Confirmed)       Active  

 

    



                           Acid reflux               Active  



                                         disease(Confirmed)    

 

    



                           Hypertension(Confirm      Active  



                                         ed)    

 

    



                           Radiculopathy(Confir      Active  



                                         med)1    

 

    



                           Simple                    Active  



                                         obesity(Confirmed)    







1L3-4



Allergies, Adverse Reactions, Alerts







   



                 Substance       Reaction        Severity        Status

 

   



                           NKDA                      Active







Medications





No data available for this section



Results





No data available for this section



Immunizations





No data available for this section



Procedures







    



              Procedure     Date         Related Diagnosis     Body Site     Status

 

    



                           Biopsy of liver           Completed

 

    



                            section          Completed

 

    



                           Cholecystectomy           Completed

 

    



                           Colonoscopy               Completed

 

    



                           Excision of cyst          Completed

 

    



                           Lumpectomy of breast        Completed







Social History







 



                           Social History Type       Response

 

 



                           Smoking Status            Never smoker; Exposure to Tobacco Smoke None; Cigarette Smoking

 Last 365



                                         Days No; Reg Smoking Cessation Counseling No



                                         entered on: 18







Assessment and Plan





No data available for this section

## 2019-01-25 NOTE — XMS REPORT
Summary of Care: 3/2/18 - 3/2/18

                             Created on: 2018



ADITI JOSEPH

External Reference #: [id number]

: 1950

Sex: Female



Demographics







                          Address                   [place]  [address]

 

                          Home Phone                [phone]

 

                          Preferred Language        English

 

                          Marital Status            

 

                          Jainism Affiliation     None

 

                          Race                      Other

 

                                        Additional Race(s)  

 

                          Ethnic Group              /Latin





Author







                          Author                    [facility]

 

                          Organization              [facility]

 

                          Address                   Unknown

 

                          Phone                     Unavailable







Encounter





ESTEFANY Ayers(TOMMY) [id number] Date(s): 3/2/18 - 3/2/18

[facility] [address] [facility], [address] [place], [place] [address] 

Discharge Disposition: Home or Self Care

Attending Physician: Cristhian Orr MD

Referring Physician: Marycruz Diamond DO





Vital Signs







 



                           Most recent to            1



                                         oldest [Reference 



                                         Range]: 

 

 



                           Height                    157.48 cm



                                         (3/2/18 10:05 AM)

 

 



                           Weight                    76.165 kg



                                         (3/2/18 10:05 AM)

 

 



                           Body Mass Index           30.71 m2



                                         (3/2/18 10:05 AM)







Problem List







    



              Condition     Effective Dates     Status       Health Status     Informant

 

    



                           Diabetes(Confirmed)       Active  

 

    



                           Acid reflux               Active  



                                         disease(Confirmed)    

 

    



                           Hypertension(Confirm      Active  



                                         ed)    

 

    



                           Radiculopathy(Confir      Active  



                                         med)1    

 

    



                           Simple                    Active  



                                         obesity(Confirmed)    







1L3-4



Allergies, Adverse Reactions, Alerts







   



                 Substance       Reaction        Severity        Status

 

   



                           NKDA                      Active







Medications





hydroxychloroquine

200 mg, PO, Daily, 0 Refill(s)

Start Date: 3/2/18

Stop Date: 18

Status: Completed



losartan

25 mg, PO, Daily, 0 Refill(s)

Start Date: 3/2/18

Status: Ordered



metFORMIN

500 mg, PO, Daily, 0 Refill(s)

Start Date: 3/2/18

Status: Ordered



omeprazole

40 mg, PO, Daily, 0 Refill(s)

Start Date: 3/2/18

Status: Ordered



Results





No data available for this section



Immunizations





No data available for this section



Procedures







    



              Procedure     Date         Related Diagnosis     Body Site     Status

 

    



                           Biopsy of liver           Completed

 

    



                            section          Completed

 

    



                           Cholecystectomy           Completed

 

    



                           Colonoscopy               Completed

 

    



                           Excision of cyst          Completed

 

    



                           Lumpectomy of breast        Completed







Social History







 



                           Social History Type       Response

 

 



                           Smoking Status            Never smoker; Exposure to Tobacco Smoke None; Cigarette Smoking

 Last 365



                                         Days No; Reg Smoking Cessation Counseling No



                                         entered on: 18







Assessment and Plan





No data available for this section

## 2019-01-25 NOTE — XMS REPORT
Summary of Care: 4/10/18 - 4/10/18

                             Created on: 2130



ADITI JOSEPH

External Reference #: [id number]

: 1950

Sex: Female



Demographics







                          Address                   [address] [place] [address]  [address]

 

                          Home Phone                [phone]

 

                          Preferred Language        English

 

                          Marital Status            

 

                          Advent Affiliation     Unknown

 

                          Race                      Other

 

                                        Additional Race(s)  

 

                          Ethnic Group              /Latin





Author







                          Author                    [facility]

 

                          Organization              [facility]

 

                          Address                   Unknown

 

                          Phone                     Unavailable







Encounter





HQ Encntr_jeny(FIN) [id number] Date(s): 4/10/18 - 4/10/18

[facility] [address] [address] [facility], [address] [place], [place] [address] 

Discharge Disposition: Home or Self Care

Attending Physician: Cristhian Orr MD

Referring Physician: Marycruz Diamond DO





Vital Signs







 



                           Most recent to            1



                                         oldest [Reference 



                                         Range]: 

 

 



                           Height                    154.94 cm



                                         (4/10/18 12:10 PM)

 

 



                           Blood Pressure            156/85 mmHg



                           [/60-90 mmHg]       *HI*



                                         (4/10/18 12:10 PM)

 

 



                           Peripheral Pulse          79 bpm



                           Rate [ bpm]         (4/10/18 12:10 PM)

 

 



                           Weight                    75 kg



                                         (4/10/18 12:10 PM)

 

 



                           Body Mass Index           31.24 m2



                                         (4/10/18 12:10 PM)







Problem List







    



              Condition     Effective Dates     Status       Health Status     Informant

 

    



                           Simple                    Active  



                                         obesity(Confirmed)    







Allergies, Adverse Reactions, Alerts





No data available for this section



Medications





No data available for this section



Results





No data available for this section



Immunizations





No data available for this section



Procedures







    



              Procedure     Date         Related Diagnosis     Body Site     Status

 

    



                           Cholecystectomy           Completed







Social History







 



                           Social History Type       Response

 

 



                           Smoking Status            Never smoker; Exposure to Tobacco Smoke None; Cigarette Smoking

 Last 365



                                         Days No; Reg Smoking Cessation Counseling No



                                         entered on: 4/10/18







Assessment and Plan





No data available for this section

## 2019-01-25 NOTE — DIAGNOSTIC IMAGING REPORT
Bilateral retrograde urography



Indications: UTI



Comparison: None



RADIATION DOSE:

     Fluoroscopy Time:  00:00:22  hh:mm:ss

     Dose (Kerma) Area Product: 222.56  cGycm2 

     Air Kerma (AK) value has been reviewed. It is below the limits set by the

Radiation Protocol Committee (RPC) committee.





Findings:



Contrast was injected into the left ureter in a retrograde fashion.The caliber

of the left ureter appear normal.  Left renal collecting system also filled

unremarkably.



Contrast was injected into the right ureter in a retrograde fashion.The caliber

of the right ureter appear normal. Right renal collecting system also filled

unremarkably.



Other: There are bilateral pedicle screws and vertical stabilizing bars in the

lower lumbar spine. Visualized hardware is intact.



IMPRESSION:



Unremarkable bilateral retrograde urography.



Signed by: Dr. Natasha Escudero MD on 1/25/2019 5:06 PM

## 2019-01-25 NOTE — XMS REPORT
Summary of Care: 18 - 18

                             Created on: 2071



ADITI JOSEPH

External Reference #: [id number]

: 1950

Sex: Female



Demographics







                          Address                   [address] [place] [address]  [address]

 

                          Home Phone                [phone]

 

                          Preferred Language        English

 

                          Marital Status            

 

                          Jainism Affiliation     Unknown

 

                          Race                      Other

 

                                        Additional Race(s)  

 

                          Ethnic Group              /Latin





Author







                          Author                    [facility]

 

                          Organization              [facility]

 

                          Address                   Unknown

 

                          Phone                     Unavailable







Encounter





HQ Encntr_alias(FIN) [id number] Date(s): 18 - 18

[facility] [address] [facility], [address] [place], [place] [address] 





Vital Signs





No data available for this section



Problem List







    



              Condition     Effective Dates     Status       Health Status     Informant

 

    



                           Simple                    Active  



                                         obesity(Confirmed)    







Allergies, Adverse Reactions, Alerts





No data available for this section



Medications





No data available for this section



Results





No data available for this section



Immunizations





No data available for this section



Procedures







    



              Procedure     Date         Related Diagnosis     Body Site     Status

 

    



                           Cholecystectomy           Completed







Social History







 



                           Social History Type       Response

 

 



                           Smoking Status            Never smoker; Exposure to Tobacco Smoke None; Cigarette Smoking

 Last 365



                                         Days No; Reg Smoking Cessation Counseling No



                                         entered on: 4/10/18







Assessment and Plan





No data available for this section

## 2019-01-25 NOTE — XMS REPORT
Summary of Care: 18 - 18

                             Created on: 2070



ADITI JOSEPH

External Reference #: [id number]

: 1950

Sex: Female



Demographics







                          Address                   [address] [place] [address]  [address]

 

                          Home Phone                [phone]

 

                          Preferred Language        English

 

                          Marital Status            

 

                          Church Affiliation     None

 

                          Race                      Other

 

                                        Additional Race(s)  

 

                          Ethnic Group              Non-





Author







                          Author                    [facility]

 

                          Organization              [facility]

 

                          Address                   Unknown

 

                          Phone                     Unavailable







Encounter





HQ Luis Antonior_jeny(FIN) [id number] Date(s): 18 - 18

[facility] [address], [address] [place] [address]-    
 





Vital Signs





No data available for this section



Problem List







    



              Condition     Effective Dates     Status       Health Status     Informant

 

    



                           Diabetes(Confirmed)       Active  

 

    



                           Acid reflux               Active  



                                         disease(Confirmed)    

 

    



                           Hypertension(Confirm      Active  



                                         ed)    

 

    



                           Radiculopathy(Confir      Active  



                                         med)1    

 

    



                           Simple                    Active  



                                         obesity(Confirmed)    







1L3-4



Allergies, Adverse Reactions, Alerts







   



                 Substance       Reaction        Severity        Status

 

   



                           NKDA                      Active







Medications





No data available for this section



Results





No data available for this section



Immunizations





No data available for this section



Procedures







    



              Procedure     Date         Related Diagnosis     Body Site     Status

 

    



                           Biopsy of liver           Completed

 

    



                            section          Completed

 

    



                           Cholecystectomy           Completed

 

    



                           Colonoscopy               Completed

 

    



                           Excision of cyst          Completed

 

    



                           Lumpectomy of breast        Completed







Social History







 



                           Social History Type       Response

 

 



                           Smoking Status            Never smoker; Exposure to Tobacco Smoke None; Cigarette Smoking

 Last 365



                                         Days No; Reg Smoking Cessation Counseling No



                                         entered on: 18







Assessment and Plan





No data available for this section

## 2019-01-25 NOTE — XMS REPORT
Summary of Care: 4/10/18 - 4/10/18

                             Created on: 2077



ADITI JOSEPH

External Reference #: [id number]

: 1950

Sex: Female



Demographics







                          Address                   [place]  [address]

 

                          Home Phone                [phone]

 

                          Preferred Language        English

 

                          Marital Status            

 

                          Episcopal Affiliation     None

 

                          Race                      Other

 

                                        Additional Race(s)  

 

                          Ethnic Group              /Latin





Author







                          Author                    [facility]

 

                          Organization              [facility]

 

                          Address                   Unknown

 

                          Phone                     Unavailable







Encounter





HQ Andria(FIN) [id number] Date(s): 4/10/18 - 4/10/18

[facility] [address], [address] [place], [place] [address] 

Discharge Disposition: Home or Self Care

Attending Physician: Cristhian Orr MD

Referring Physician: Marycruz Diamond DO





Vital Signs







 



                           Most recent to            1



                                         oldest [Reference 



                                         Range]: 

 

 



                           Height                    154.94 cm



                                         (4/10/18 12:10 PM)

 

 



                           Blood Pressure            156/85 mmHg



                           [/60-90 mmHg]       *HI*



                                         (4/10/18 12:10 PM)

 

 



                           Peripheral Pulse          79 bpm



                           Rate [ bpm]         (4/10/18 12:10 PM)

 

 



                           Weight                    75 kg



                                         (4/10/18 12:10 PM)

 

 



                           Body Mass Index           31.24 m2



                                         (4/10/18 12:10 PM)







Problem List







    



              Condition     Effective Dates     Status       Health Status     Informant

 

    



                           Diabetes(Confirmed)       Active  

 

    



                           Acid reflux               Active  



                                         disease(Confirmed)    

 

    



                           Hypertension(Confirm      Active  



                                         ed)    

 

    



                           Radiculopathy(Confir      Active  



                                         med)1    

 

    



                           Simple                    Active  



                                         obesity(Confirmed)    







1L3-4



Allergies, Adverse Reactions, Alerts







   



                 Substance       Reaction        Severity        Status

 

   



                           NKDA                      Active







Medications





No data available for this section



Results





No data available for this section



Immunizations





No data available for this section



Procedures







    



              Procedure     Date         Related Diagnosis     Body Site     Status

 

    



                           Biopsy of liver           Completed

 

    



                            section          Completed

 

    



                           Cholecystectomy           Completed

 

    



                           Colonoscopy               Completed

 

    



                           Excision of cyst          Completed

 

    



                           Lumpectomy of breast        Completed







Social History







 



                           Social History Type       Response

 

 



                           Smoking Status            Never smoker; Exposure to Tobacco Smoke None; Cigarette Smoking

 Last 365



                                         Days No; Reg Smoking Cessation Counseling No



                                         entered on: 18







Assessment and Plan





No data available for this section

## 2019-01-25 NOTE — XMS REPORT
Summary of Care: 18 - 18

                             Created on: 2071



ADITI JOSEPH

External Reference #: [id number]

: 1950

Sex: Female



Demographics







                          Address                   [address] [place] [address]  [address]

 

                          Home Phone                [phone]

 

                          Preferred Language        English

 

                          Marital Status            

 

                          Tenriism Affiliation     Unknown

 

                          Race                      Other

 

                                        Additional Race(s)  

 

                          Ethnic Group              /Latin





Author







                          Author                    [facility]

 

                          Organization              [facility]

 

                          Address                   Unknown

 

                          Phone                     Unavailable







Encounter





HQ Encntr_alias(FIN) [id number] Date(s): 18 - 18

[facility] [address] [place], [place] [address] 

Discharge Disposition: Home or Self Care

Attending Physician: Cristhian Orr MD

Referring Physician: Marycruz Diamond DO





Vital Signs





No data available for this section



Problem List







    



              Condition     Effective Dates     Status       Health Status     Informant

 

    



                           Simple                    Active  



                                         obesity(Confirmed)    







Allergies, Adverse Reactions, Alerts





No data available for this section



Medications





gabapentin 300 mg oral capsule

See Instructions, PO, Take one cap PO at Bedtime x 3 nights, then 1 cap PO BID x
3 days, then 1 cap PO TID thereafter, # 90 cap, 0 Refill(s), Pharmacy: [id number]

Start Date: 18

Status: Ordered



Results





No data available for this section



Immunizations





No data available for this section



Procedures







    



              Procedure     Date         Related Diagnosis     Body Site     Status

 

    



                           Cholecystectomy           Completed







Social History







 



                           Social History Type       Response

 

 



                           Smoking Status            Never smoker; Exposure to Tobacco Smoke None; Cigarette Smoking

 Last 365



                                         Days No; Reg Smoking Cessation Counseling No



                                         entered on: 4/10/18







Assessment and Plan





No data available for this section

## 2019-01-25 NOTE — XMS REPORT
Summary of Care: 18 - 18

                             Created on: 2101



ADITI JOSEPH

External Reference #: [id number]

: 1950

Sex: Female



Demographics







                          Address                   [address] [place] [address]  [address]

 

                          Home Phone                [phone]

 

                          Preferred Language        English

 

                          Marital Status            

 

                          Mandaeism Affiliation     Unknown

 

                          Race                      Other

 

                                        Additional Race(s)  

 

                          Ethnic Group              /Latin





Author







                          Author                    [facility]

 

                          Organization              [facility]

 

                          Address                   Unknown

 

                          Phone                     Unavailable







Encounter





HQ Encntr_alias(FIN) [id number] Date(s): 18 - 18

[facility] [address], [address] [place], [place] [address] 





Vital Signs





No data available for this section



Problem List







    



              Condition     Effective Dates     Status       Health Status     Informant

 

    



                           Simple                    Active  



                                         obesity(Confirmed)    







Allergies, Adverse Reactions, Alerts





No data available for this section



Medications





No data available for this section



Results





No data available for this section



Immunizations





No data available for this section



Procedures







    



              Procedure     Date         Related Diagnosis     Body Site     Status

 

    



                           Cholecystectomy           Completed







Social History







 



                           Social History Type       Response

 

 



                           Smoking Status            Never smoker; Exposure to Tobacco Smoke None; Cigarette Smoking

 Last 365



                                         Days No; Reg Smoking Cessation Counseling No



                                         entered on: 4/10/18







Assessment and Plan





No data available for this section

## 2019-01-25 NOTE — XMS REPORT
Summary of Care: 18 - 18

                             Created on: 2025



ADITI JOSEPH

External Reference #: [id number]

: 1950

Sex: Female



Demographics







                          Address                   [address] [place] [address]  [address]

 

                          Home Phone                [phone]

 

                          Preferred Language        Unknown

 

                          Marital Status            

 

                          Zoroastrianism Affiliation     None

 

                          Race                      Other

 

                                        Additional Race(s)  

 

                          Ethnic Group              Non-





Author







                          Author                    [facility]

 

                          Organization              [facility]

 

                          Address                   Unknown

 

                          Phone                     Unavailable







Encounter





HQ Andria(FIN) [id number] Date(s): 18 - 18

[facility] [address] [place] [address]      [phone]
[phone]

Discharge Disposition: Home or Self Care

Attending Physician: Rosendo Varela MD





Vital Signs





No data available for this section



Problem List







    



              Condition     Effective Dates     Status       Health Status     Informant

 

    



                           Diabetes(Confirmed)       Active  

 

    



                           Acid reflux               Active  



                                         disease(Confirmed)    

 

    



                           Hypertension(Confirm      Active  



                                         ed)    

 

    



                           Radiculopathy(Confir      Active  



                                         med)1    

 

    



                           Simple                    Active  



                                         obesity(Confirmed)    







1L3-4



Allergies, Adverse Reactions, Alerts







   



                 Substance       Reaction        Severity        Status

 

   



                           NKDA                      Active







Medications





No data available for this section



Results





No data available for this section



Immunizations





No data available for this section



Procedures







    



              Procedure     Date         Related Diagnosis     Body Site     Status

 

    



                           Biopsy of liver           Completed

 

    



                            section          Completed

 

    



                           Cholecystectomy           Completed

 

    



                           Colonoscopy               Completed

 

    



                           Excision of cyst          Completed

 

    



                           Lumpectomy of breast        Completed







Social History







 



                           Social History Type       Response

 

 



                           Smoking Status            Never smoker; Exposure to Tobacco Smoke None; Cigarette Smoking

 Last 365



                                         Days No; Reg Smoking Cessation Counseling No



                                         entered on: 18







Assessment and Plan





No data available for this section

## 2019-01-25 NOTE — XMS REPORT
Patient Summary Document

                             Created on: 2019



ADITI JOSEPH

External Reference #: [id number]

: 1950

Sex: Female



Demographics







                          Address                   [address] [place] [address]

 

                          Home Phone                [phone]

 

                          Preferred Language        Unknown

 

                          Marital Status            Unknown

 

                          Holiness Affiliation     Unknown

 

                          Race                      Unknown

 

                          Ethnic Group              Unknown





Author







                          Author                    [facility]

 

                          Address                   Unknown

 

                          Phone                     Unavailable







Care Team Providers







                    Care Team Member Name    Role                Phone

 

                    MARY ANN ATWOODИван BRENDAN      Unavailable         Unavailable







Problems

This patient has no known problems.



Allergies, Adverse Reactions, Alerts

This patient has no known allergies or adverse reactions.



Medications

This patient has no known medications.



Results







           Test Description    Test Time    Test Comments    Text Results    Atomic Results    Result

 Comments

 

                HEPATITIS B SURFACE ANTIBODY    2018-10-15 20:28:00                      

 

   

 

                HEPATITIS B SURFACE ANTIBODY (BEAKER) (test code=647)    < mIU/mL        <8.0             





TSH2018-10-15 20:26:00* 





                Test Item       Value           Reference Range    Comments

 

                THYROID STIMULATING HORMONE (BEAKER) (test code=772)    1.32 uIU/mL     0.35-4.94        





IMMUNOGLOBULIN G (IGG)2018-10-15 20:00:00* 





                Test Item       Value           Reference Range    Comments

 

                IMMUNOGLOBULIN G (IGG) (BEAKER) (test code=427)    2199 mg/dL      540-1822         





MR, ABDOMEN, [id number] 15:20:00Referring: Dr. Kirt Webster REPORT 
PATIENT ID:   [id number] MRI of the abdomen dated 2018 COMPARISON: 
2018 Comment: Multiplanar T1 and T2-weighted images of the abdomen, 
postcontrast axial and coronal T1-weighted images of the abdomen were obtained. 
Liver is cirrhotic in appearance with the ureter margins. No abnormal enha
ncement or suspicious mass is seen in the liver. Spleen is in upper limits of no
rmal in size. The splenic, superior mesenteric, portal, and hepatic veins are pa
tent. Main portal vein measures approximately 10 mm in diameter. Paraesophageal 
varices is present. Pancreas is normal in caliber. A 4 mm cyst is seen in the ta
il of the pancreas. No pancreatic duct dilatation or enhancing pancreatic mass i
s noted. Both adrenals unremarkable. Kidneys are normal in size and functioning.
No mass, adenopathy, ascites is seen in the abdomen. The visualized small and l
arge bowel are unremarkable. IMPRESSION:1. Cirrhosis with splenomegaly and sean
l hypertension.2. No suspicious hepatic mass.3. Small nonspecific cyst in the ta
il of the pancreas. Signed: Isidra Hurtado Verified Date/Time:  10/08/2018
15:20:04 Reading Location: [address] Radiology Reading Room      Electronic
ally signed by: ISIDRA HURTADO M.D. on 10/08/2018 03:20 PM PROTHROMBIN TIME/INR
2018 16:32:00* 





                Test Item       Value           Reference Range    Comments

 

                PROTIME (BEAKER) (test code=759)    16.5 seconds    11.7-14.7        

 

                INR (BEAKER) (test code=370)    1.3             <=5.9            





RECOMMENDED COUMADIN/WARFARIN INR THERAPY RANGESSTANDARD DOSE: 2.0 - 3.0   Inclu
chu: PROPHYLAXIS for venous thrombosis, systemic embolization; TREATMENT for brody
ous thrombosis and/or pulmonary embolus.HIGH RISK: Target INR is 2.5-3.5 for pat
ients with mechanical heart valves.ALPHA FETOPROTEIN (AFP), TUMOR MARKER
2018 15:37:00* 





                Test Item       Value           Reference Range    Comments

 

                ALPHA-FETOPROTEIN (BEAKER) (test code=1094)    6.4 ng/mL       <10.0            





HEPATIC FUNCTION [id number] 15:20:00* 





                Test Item       Value           Reference Range    Comments

 

                TOTAL PROTEIN (BEAKER) (test code=770)    8.2 gm/dL       6.0-8.3          

 

                ALBUMIN (BEAKER) (test code=1145)    3.5 g/dL        3.5-5.0          

 

                BILIRUBIN TOTAL (BEAKER) (test code=377)    1.3 mg/dL       0.2-1.2          

 

                BILIRUBIN DIRECT (BEAKER) (test code=706)    0.5 mg/dL       0.1-0.5          

 

                ALKALINE PHOSPHATASE (BEAKER) (test code=346)    113 U/L                    

 

                AST (SGOT) (BEAKER) (test code=353)    50 U/L          5-34             

 

                ALT (SGPT) (BEAKER) (test code=347)    37 U/L          6-55             





BASIC METABOLIC [id number] 15:20:00* 





                Test Item       Value           Reference Range    Comments

 

                SODIUM (BEAKER) (test code=381)    139 meq/L       136-145          

 

                POTASSIUM (BEAKER) (test code=379)    4.1 meq/L       3.5-5.1          

 

                CHLORIDE (BEAKER) (test code=382)    106 meq/L                  

 

                CO2 (BEAKER) (test code=355)    26 meq/L        22-29            

 

                BLOOD UREA NITROGEN (BEAKER) (test code=354)    17 mg/dL        7-21             

 

                CREATININE (BEAKER) (test code=358)    0.61 mg/dL      0.57-1.25        

 

                GLUCOSE RANDOM (BEAKER) (test code=652)    91 mg/dL                   

 

                CALCIUM (BEAKER) (test code=697)    9.3 mg/dL       8.4-10.2         

 

                EGFR (BEAKER) (test code=1092)    98 mL/min/1.73 sq m                    ESTIMATED GFR IS NOT AS 

ACCURATE AS CREATININE CLEARANCE IN PREDICTING GLOMERULAR FILTRATION RATE. 
ESTIMATED GFR IS NOT APPLICABLE FOR DIALYSIS PATIENTS.





CBC W/PLT COUNT & AUTO [id number] 14:54:00* 





                Test Item       Value           Reference Range    Comments

 

                WHITE BLOOD CELL COUNT (BEAKER) (test code=775)    3.5 K/ L        3.5-10.5         

 

                RED BLOOD CELL COUNT (BEAKER) (test code=761)    4.02 M/ L       3.93-5.22        

 

                HEMOGLOBIN (BEAKER) (test code=410)    13.1 GM/DL      11.2-15.7        

 

                HEMATOCRIT (BEAKER) (test code=411)    39.1 %          34.1-44.9        

 

                MEAN CORPUSCULAR VOLUME (BEAKER) (test code=753)    97.3 fL         79.4-94.8        

 

                MEAN CORPUSCULAR HEMOGLOBIN (BEAKER) (test code=751)    32.6 pg         25.6-32.2        

 

                    MEAN CORPUSCULAR HEMOGLOBIN CONC (BEAKER) (test code=752)    33.5 GM/DL          32.2-35.5

                                         

 

                RED CELL DISTRIBUTION WIDTH (BEAKER) (test code=412)    13.2 %          11.7-14.4        

 

                PLATELET COUNT (BEAKER) (test code=756)    78 K/CU MM      150-450          

 

                MEAN PLATELET VOLUME (BEAKER) (test code=754)    12.2 fL         9.4-12.3         

 

                NUCLEATED RED BLOOD CELLS (BEAKER) (test code=413)    0 /100 WBC      0-0              

 

                NEUTROPHILS RELATIVE PERCENT (BEAKER) (test code=429)    51 %                             

 

                LYMPHOCYTES RELATIVE PERCENT (BEAKER) (test code=430)    35 %                             

 

                MONOCYTES RELATIVE PERCENT (BEAKER) (test code=431)    12 %                             

 

                EOSINOPHILS RELATIVE PERCENT (BEAKER) (test code=432)    1 %                              

 

                BASOPHILS RELATIVE PERCENT (BEAKER) (test code=437)    1 %                              

 

                NEUTROPHILS ABSOLUTE COUNT (BEAKER) (test code=670)    1.79 K/ L       1.56-6.13        

 

                LYMPHOCYTES ABSOLUTE COUNT (BEAKER) (test code=414)    1.21 K/ L       1.18-3.74        

 

                MONOCYTES ABSOLUTE COUNT (BEAKER) (test code=415)    0.43 K/ L       0.24-0.36        

 

                EOSINOPHILS ABSOLUTE COUNT (BEAKER) (test code=416)    0.05 K/ L       0.04-0.36        

 

                BASOPHILS ABSOLUTE COUNT (BEAKER) (test code=417)    0.02 K/ L       0.01-0.08        

 

                IMMATURE GRANULOCYTES-RELATIVE PERCENT (BEAKER) (test code=2801)    0 %             0-1              





MR, ABDOMEN, [id number] 13:28:00Referring: Dr. Kirt OharaFINAL REPORT 
PATIENT ID:   [id number] MRI abdomen without and with contrast.  INDICATION: 
cirrhosis COMPARISON: 2017 TECHNIQUE: Multiplanar multisequence MRI 
examination of the abdomen was performed before and after the administration of 
intravenous gadolinium in a dynamic fashion.  FINDINGS: The liver demonstrates 
cirrhotic morphology. There are punctate foci of arterial enhancement which are 
too small to characterize without associated washout of contrast on subsequent p
hases, likely representing vascular shunting. No suspicious enhancing hepatic ma
ss is seen. The spleen is enlarged. The gallbladder is not visualized. There is 
no biliary dilatation. There is no pancreatic ductal dilatation. Again seen is a
subcentimeter cystic lesion in the tail of the pancreas measuring approximately 
6 mm possibly representing a small IPMN, without change. The adrenal glands and 
kidneys are unremarkable. There are subcentimeter nonenhancing lesions in both 
kidneys, too small to characterize suggestive of cysts. There is no ascites. The
re is no bowel obstruction. There is no fluid collection or hematoma. There is n
o lymphadenopathy in the abdomen. The osseous structures are intact. The main po
rtal vein measures 11 mm. The hepatic veins, SMV, splenic vein, and portal vein 
are patent. 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 IMPRESSION:1. Hepatic cirrho
sis and evidence of portal hypertension. No suspicious enhancing mass.2. Stable 
subcentimeter pancreatic tail lesion, amenable to continued follow-up flakita la Signed: Kathy Kaplan MDReport Verified Date/Time:  2018 13:28:53 Reading 
Location: [place] [id number] Ortho Consult Reading Room       Electronically signed by
: KATHY KAPLAN M.D. on 2018 01:28 PM [id number] 11:22:00* 





                Test Item       Value           Reference Range    Comments

 

                POC-CREATININE (BEAKER) (test code=1859)    0.5 mg/dL       0.6-1.3         TESTED AT [facility] [address]

 [facility] [address]

 

                POC-EGFR (BEAKER) (test code=1860)    124 mL/min/1.73M2                     





U/S, ABDOMINAL, [id number] 14:04:00Referring: Dr. Kirt Yung for
Exam:->Cirrhosis/ screening for cancerFINAL REPORT PATIENT ID:   [id number]   
Abdominal ultrasound dated 2017 Clinical information:Cirrhosis/ screening 
for cancer Comment:  Real-time transabdominal ultrasound was performed. Liver is
normal in size and measures 15.7 cm in length. The echogenicity of the liver is 
heterogeneous.  No focal lesion is noted in the liver.  Spleen is normal in size
without focal abnormality. Gallbladder is surgically absent. No biliary 
dilatation is seen. Common bile duct measures 3 mm in diameter.  Main portal 
vein measures 9 mm in diameter. Pancreas is suboptimally evaluated. The 
previously noted cystic lesion in the tail of the pancreas on the MRI is not 
seen. Right kidney measures 11.2 x 4.7 x 5.4 cm.  Left kidney measures 11.3 x 
6.6 x 5.5 cm.  Echogenicity of both kidney is normal.  No hydronephrosis or 
solid mass seen in either kidney.  No cyst  is seen in the either kidney. No 
ascites is present in the abdomen. Abdominal aorta is normal in caliber. IVC and
Hepatic veins are patent.  Impression: 1. Heterogeneous appearing liver.2. 
Incomplete visualization of the pancreas.3. Otherwise unremarkable abdominal 
ultrasound. Signed: Isidra Hurtado MDReport Verified Date/Time:  2017 14:04:
40 Reading Location: [address] Radiology Reading Room      Electronically si
gned by: ISIDRA HURTADO M.D. on 2017 02:04 PM ALPHA FETOPROTEIN (AFP), 
TUMOR [id number] 17:46:00* 





                Test Item       Value           Reference Range    Comments

 

                ALPHA-FETOPROTEIN (BEAKER) (test code=1094)    6.3 ng/mL       <10.0            





HEPATIC FUNCTION [id number] 17:23:00* 





                Test Item       Value           Reference Range    Comments

 

                TOTAL PROTEIN (BEAKER) (test code=770)    7.9 gm/dL       6.0-8.3          

 

                ALBUMIN (BEAKER) (test code=1145)    3.2 g/dL        3.5-5.0          

 

                BILIRUBIN TOTAL (BEAKER) (test code=377)    1.2 mg/dL       0.2-1.2          

 

                BILIRUBIN DIRECT (BEAKER) (test code=706)    0.4 mg/dL       0.1-0.5          

 

                ALKALINE PHOSPHATASE (BEAKER) (test code=346)    137 U/L                    

 

                AST (SGOT) (BEAKER) (test code=353)    56 U/L          5-34             

 

                ALT (SGPT) (BEAKER) (test code=347)    32 U/L          6-55             





BASIC METABOLIC [id number] 17:23:00* 





                Test Item       Value           Reference Range    Comments

 

                SODIUM (BEAKER) (test code=381)    139 meq/L       136-145          

 

                POTASSIUM (BEAKER) (test code=379)    3.7 meq/L       3.5-5.1          

 

                CHLORIDE (BEAKER) (test code=382)    108 meq/L                  

 

                CO2 (BEAKER) (test code=355)    25 meq/L        22-29            

 

                BLOOD UREA NITROGEN (BEAKER) (test code=354)    15 mg/dL        7-21             

 

                CREATININE (BEAKER) (test code=358)    0.62 mg/dL      0.57-1.25        

 

                GLUCOSE RANDOM (BEAKER) (test code=652)    154 mg/dL                  

 

                CALCIUM (BEAKER) (test code=697)    8.7 mg/dL       8.4-10.2         

 

                EGFR (BEAKER) (test code=1092)    97 mL/min/1.73 sq m                    ESTIMATED GFR IS NOT AS 

ACCURATE AS CREATININE CLEARANCE IN PREDICTING GLOMERULAR FILTRATION RATE. 
ESTIMATED GFR IS NOT APPLICABLE FOR DIALYSIS PATIENTS.





PROTHROMBIN [id number] 17:15:00* 





                Test Item       Value           Reference Range    Comments

 

                PROTIME (BEAKER) (test code=759)    16.0 seconds    11.7-14.7        

 

                INR (BEAKER) (test code=370)    1.3             <=5.9            





RECOMMENDED COUMADIN/WARFARIN INR THERAPY RANGESSTANDARD DOSE: 2.0 - 3.0   Inclu
chu: PROPHYLAXIS for venous thrombosis, systemic embolization; TREATMENT for brody
ous thrombosis and/or pulmonary embolus.HIGH RISK: Target INR is 2.5-3.5 for pat
ients with mechanical heart valves.CBC W/PLT COUNT & AUTO [id number]
17:03:00* 





                Test Item       Value           Reference Range    Comments

 

                WHITE BLOOD CELL COUNT (BEAKER) (test code=775)    3.3 K/ L        3.5-10.5         

 

                RED BLOOD CELL COUNT (BEAKER) (test code=761)    3.86 M/ L       3.93-5.22        

 

                HEMOGLOBIN (BEAKER) (test code=410)    12.9 GM/DL      11.2-15.7        

 

                HEMATOCRIT (BEAKER) (test code=411)    38.7 %          34.1-44.9        

 

                MEAN CORPUSCULAR VOLUME (BEAKER) (test code=753)    100.3 fL        79.4-94.8        

 

                MEAN CORPUSCULAR HEMOGLOBIN (BEAKER) (test code=751)    33.4 pg         25.6-32.2        

 

                    MEAN CORPUSCULAR HEMOGLOBIN CONC (BEAKER) (test code=752)    33.3 GM/DL          32.2-35.5

                                         

 

                RED CELL DISTRIBUTION WIDTH (BEAKER) (test code=412)    14.3 %          11.7-14.4        

 

                PLATELET COUNT (BEAKER) (test code=756)    91 K/CU MM      150-450          

 

                MEAN PLATELET VOLUME (BEAKER) (test code=754)    11.5 fL         9.4-12.3         

 

                NUCLEATED RED BLOOD CELLS (BEAKER) (test code=413)    0 /100 WBC      0-0              

 

                NEUTROPHILS RELATIVE PERCENT (BEAKER) (test code=429)    47 %                             

 

                LYMPHOCYTES RELATIVE PERCENT (BEAKER) (test code=430)    37 %                             

 

                MONOCYTES RELATIVE PERCENT (BEAKER) (test code=431)    12 %                             

 

                EOSINOPHILS RELATIVE PERCENT (BEAKER) (test code=432)    3 %                              

 

                BASOPHILS RELATIVE PERCENT (BEAKER) (test code=437)    1 %                              

 

                NEUTROPHILS ABSOLUTE COUNT (BEAKER) (test code=670)    1.54 K/ L       1.56-6.13        

 

                LYMPHOCYTES ABSOLUTE COUNT (BEAKER) (test code=414)    1.23 K/ L       1.18-3.74        

 

                MONOCYTES ABSOLUTE COUNT (BEAKER) (test code=415)    0.41 K/ L       0.24-0.36        

 

                EOSINOPHILS ABSOLUTE COUNT (BEAKER) (test code=416)    0.09 K/ L       0.04-0.36        

 

                BASOPHILS ABSOLUTE COUNT (BEAKER) (test code=417)    0.02 K/ L       0.01-0.08        

 

                IMMATURE GRANULOCYTES-RELATIVE PERCENT (BEAKER) (test code=2801)    0 %             0-1              





COMPREHENSIVE METABOLIC [id number] 11:43:00* 





                Test Item       Value           Reference Range    Comments

 

                TOTAL PROTEIN (BEAKER) (test code=770)    8.1 gm/dL       6.0-8.3         Specimen slightly hemolyzed



 

                ALBUMIN (BEAKER) (test code=1145)    3.2 g/dL        3.5-5.0         Specimen slightly hemolyzed



 

                ALKALINE PHOSPHATASE (BEAKER) (test code=346)    138 U/L                    

 

                BILIRUBIN TOTAL (BEAKER) (test code=377)    1.0 mg/dL       0.2-1.2         Specimen slightly 

hemolyzed

 

                SODIUM (BEAKER) (test code=381)    138 meq/L       136-145          

 

                POTASSIUM (BEAKER) (test code=379)    3.9 meq/L       3.5-5.1         Specimen slightly hemolyzed



 

                CHLORIDE (BEAKER) (test code=382)    107 meq/L                  

 

                CO2 (BEAKER) (test code=355)    23 meq/L        22-29            

 

                BLOOD UREA NITROGEN (BEAKER) (test code=354)    18 mg/dL        7-21             

 

                CREATININE (BEAKER) (test code=358)    0.60 mg/dL      0.57-1.25       Specimen slightly hemolyzed



 

                GLUCOSE RANDOM (BEAKER) (test code=652)    117 mg/dL                  

 

                CALCIUM (BEAKER) (test code=697)    8.8 mg/dL       8.4-10.2         

 

                AST (SGOT) (BEAKER) (test code=353)    53 U/L          5-34            Specimen slightly hemolyzed

 

                ALT (SGPT) (BEAKER) (test code=347)    28 U/L          6-55            Specimen slightly hemolyzed

 

                EGFR (BEAKER) (test code=1092)    101 mL/min/1.73 sq m                    ESTIMATED GFR IS NOT AS

 ACCURATE AS CREATININE CLEARANCE IN PREDICTING GLOMERULAR FILTRATION RATE. 
ESTIMATED GFR IS NOT APPLICABLE FOR DIALYSIS PATIENTS.





CBC W/PLT COUNT & AUTO [id number] 11:07:00* 





                Test Item       Value           Reference Range    Comments

 

                WHITE BLOOD CELL COUNT (BEAKER) (test code=775)    4.0 K/ L        3.5-10.5         

 

                RED BLOOD CELL COUNT (BEAKER) (test code=761)    3.87 M/ L       3.93-5.22        

 

                HEMOGLOBIN (BEAKER) (test code=410)    12.9 GM/DL      11.2-15.7        

 

                HEMATOCRIT (BEAKER) (test code=411)    38.1 %          34.1-44.9        

 

                MEAN CORPUSCULAR VOLUME (BEAKER) (test code=753)    98.4 fL         79.4-94.8        

 

                MEAN CORPUSCULAR HEMOGLOBIN (BEAKER) (test code=751)    33.3 pg         25.6-32.2        

 

                    MEAN CORPUSCULAR HEMOGLOBIN CONC (BEAKER) (test code=752)    33.9 GM/DL          32.2-35.5

                                         

 

                RED CELL DISTRIBUTION WIDTH (BEAKER) (test code=412)    14.7 %          11.7-14.4        

 

                PLATELET COUNT (BEAKER) (test code=756)    94 K/CU MM      150-450          

 

                MEAN PLATELET VOLUME (BEAKER) (test code=754)    11.6 fL         9.4-12.3         

 

                NUCLEATED RED BLOOD CELLS (BEAKER) (test code=413)    0 /100 WBC      0-0              

 

                NEUTROPHILS RELATIVE PERCENT (BEAKER) (test code=429)    46 %                             

 

                LYMPHOCYTES RELATIVE PERCENT (BEAKER) (test code=430)    37 %                             

 

                MONOCYTES RELATIVE PERCENT (BEAKER) (test code=431)    13 %                             

 

                EOSINOPHILS RELATIVE PERCENT (BEAKER) (test code=432)    3 %                              

 

                BASOPHILS RELATIVE PERCENT (BEAKER) (test code=437)    0 %                              

 

                NEUTROPHILS ABSOLUTE COUNT (BEAKER) (test code=670)    1.81 K/ L       1.56-6.13        

 

                LYMPHOCYTES ABSOLUTE COUNT (BEAKER) (test code=414)    1.46 K/ L       1.18-3.74        

 

                MONOCYTES ABSOLUTE COUNT (BEAKER) (test code=415)    0.53 K/ L       0.24-0.36        

 

                EOSINOPHILS ABSOLUTE COUNT (BEAKER) (test code=416)    0.13 K/ L       0.04-0.36        

 

                BASOPHILS ABSOLUTE COUNT (BEAKER) (test code=417)    0.01 K/ L       0.01-0.08        

 

                IMMATURE GRANULOCYTES-RELATIVE PERCENT (BEAKER) (test code=2801)    0 %             0-1              





CBC W/PLT COUNT & AUTO [id number] 11:20:00* 





                Test Item       Value           Reference Range    Comments

 

                WHITE BLOOD CELL COUNT (BEAKER) (test code=775)    3.5 K/ L        4.0-10.0         

 

                RED BLOOD CELL COUNT (BEAKER) (test code=761)    3.95 M/ L       4.00-5.00        

 

                HEMOGLOBIN (BEAKER) (test code=410)    13.6 GM/DL      12.0-15.0        

 

                HEMATOCRIT (BEAKER) (test code=411)    40.0 %          36.0-45.0        

 

                MEAN CORPUSCULAR VOLUME (BEAKER) (test code=753)    101.0 fL        82.0-99.0        

 

                MEAN CORPUSCULAR HEMOGLOBIN (BEAKER) (test code=751)    34.4 pg         27.0-33.0        

 

                    MEAN CORPUSCULAR HEMOGLOBIN CONC (BEAKER) (test code=752)    34.0 GM/DL          32.0-36.0

                                         

 

                RED CELL DISTRIBUTION WIDTH (BEAKER) (test code=412)    14.3 %          10.3-14.2        

 

                PLATELET COUNT (BEAKER) (test code=756)    87 K/CU MM      150-430          

 

                MEAN PLATELET VOLUME (BEAKER) (test code=754)    8.8 fL          6.5-10.5         

 

                NUCLEATED RED BLOOD CELLS (BEAKER) (test code=413)    0 /100 WBC      0-0              

 

                NEUTROPHILS RELATIVE PERCENT (BEAKER) (test code=429)    53 %                             

 

                LYMPHOCYTES RELATIVE PERCENT (BEAKER) (test code=430)    33 %                             

 

                MONOCYTES RELATIVE PERCENT (BEAKER) (test code=431)    11 %                             

 

                EOSINOPHILS RELATIVE PERCENT (BEAKER) (test code=432)    2 %                              

 

                BASOPHILS RELATIVE PERCENT (BEAKER) (test code=437)    1 %                              

 

                NEUTROPHILS ABSOLUTE COUNT (BEAKER) (test code=670)    1.84 K/ L       1.80-8.00        

 

                LYMPHOCYTES ABSOLUTE COUNT (BEAKER) (test code=414)    1.14 K/ L       1.48-4.50        

 

                MONOCYTES ABSOLUTE COUNT (BEAKER) (test code=415)    0.38 K/ L       0.00-1.30        

 

                EOSINOPHILS ABSOLUTE COUNT (BEAKER) (test code=416)    0.09 K/ L       0.00-0.50        

 

                BASOPHILS ABSOLUTE COUNT (BEAKER) (test code=417)    0.04 K/ L       0.00-0.20        





0.00COMPREHENSIVE METABOLIC [id number] 11:06:00* 





                Test Item       Value           Reference Range    Comments

 

                TOTAL PROTEIN (BEAKER) (test code=770)    7.9 gm/dL       6.0-8.3         Specimen slightly hemolyzed



 

                ALBUMIN (BEAKER) (test code=1145)    3.2 g/dL        3.5-5.0         Specimen slightly hemolyzed



 

                ALKALINE PHOSPHATASE (BEAKER) (test code=346)    139 U/L                    

 

                BILIRUBIN TOTAL (BEAKER) (test code=377)    1.2 mg/dL       0.2-1.2         Specimen slightly 

hemolyzed

 

                SODIUM (BEAKER) (test code=381)    137 meq/L       136-145          

 

                POTASSIUM (BEAKER) (test code=379)    3.7 meq/L       3.5-5.1         Specimen slightly hemolyzed



 

                CHLORIDE (BEAKER) (test code=382)    107 meq/L                  

 

                CO2 (BEAKER) (test code=355)    23 meq/L        22-29            

 

                BLOOD UREA NITROGEN (BEAKER) (test code=354)    15 mg/dL        7-21             

 

                CREATININE (BEAKER) (test code=358)    0.62 mg/dL      0.57-1.25       Specimen slightly hemolyzed



 

                GLUCOSE RANDOM (BEAKER) (test code=652)    119 mg/dL                  

 

                CALCIUM (BEAKER) (test code=697)    8.6 mg/dL       8.4-10.2         

 

                AST (SGOT) (BEAKER) (test code=353)    51 U/L          5-34            Specimen slightly hemolyzed

 

                ALT (SGPT) (BEAKER) (test code=347)    29 U/L          6-55            Specimen slightly hemolyzed

 

                EGFR (GERMAN) (test code=1092)    97 mL/min/1.73 sq m                    ESTIMATED GFR IS NOT AS 

ACCURATE AS CREATININE CLEARANCE IN PREDICTING GLOMERULAR FILTRATION RATE. 
ESTIMATED GFR IS NOT APPLICABLE FOR DIALYSIS PATIENTS.





TISSUE [id number] 16:41:00Surgical Pathology Report                         
Case: [id number]                                 Authorizing Provider:  Brendan Atwood MD         Collected:           2017 0943            Ordering 
Location:     [facility]      Received:            2017 1307   
        Pathologist:           Silvia Mosley MD                              
                         Specimen:    Biopsy, Liver, NATIVE LIVER BIOPSY        
                                          LIVER, ULTRASOUND-GUIDED NEEDLE 
BIOPSIES- CIRRHOSIS- MODERATE LYMPHOPLASMACYTIC INFLAMMATION INVOLVING FIBROUS 
SEPTA- MILD STEATOSIS WITH RARE BALLOONING DEGENERATION- SEE 
COMMENTElectronically signed by Silvia Mosley MD on 2017 at  4:41 PMThe 
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 
concomitant component of steatohepatitis (burnt-out). Clinical correlation is 
suggested,[id number], [id number] X89-year-old female with cirrhosis concerned for 
autoimmune, hepatitis as etiologyNative liver biopsyThe specimen is received in 
a formalin-filled container and labeled with the patient's information and 
labeled "native liver biopsy" and consists of two red-brown core biopsies rang
ing from 1.6 to 18 cm, submitted entirely A1. CG/plSection shows two cores of li
jimbo parenchyma with greater than 10 portal tracts and is adequate for evaluation
. Trichrome and reticulin stain shows distortion of architecture with septal fib
rosis and nodule formation. There is mild steatosis, mixed large droplet, medium
droplet and small droplet type. Foci of pericellular fibrosis are seen. Rare ba
llooning degeneration is seen. Glycogenated nuclei are present. Mild canalicular
cholestasis is present. The fibrous septa shows mild to focally moderate lympho
plasmacytic inflammation with small clusters of plasma cells. Focal grade 2 inte
rface hepatitis is noted. No cholestasis is seen. The bile ducts are preserved a
nd unremarkable. No periductal inflammation, granulomas or bile duct scars are s
een. Iron stain is negative. No hyaline globules are seen on PAS with diastase s
tain.PLATELET [id number] 08:44:00* 





                Test Item       Value           Reference Range    Comments

 

                PLATELET COUNT (GERMAN) (test code=756)    81 K/CU MM      150-430          





COMPREHENSIVE METABOLIC [id number] 08:07:00* 





                Test Item       Value           Reference Range    Comments

 

                TOTAL PROTEIN (BEAKER) (test code=770)    7.8 gm/dL       6.0-8.3         Specimen slightly hemolyzed



 

                ALBUMIN (BEAKER) (test code=1145)    3.1 g/dL        3.5-5.0         Specimen slightly hemolyzed



 

                ALKALINE PHOSPHATASE (BEAKER) (test code=346)    141 U/L                    

 

                BILIRUBIN TOTAL (BEAKER) (test code=377)    1.9 mg/dL       0.2-1.2         Specimen slightly 

hemolyzed

 

                SODIUM (BEAKER) (test code=381)    137 meq/L       136-145          

 

                POTASSIUM (BEAKER) (test code=379)    3.8 meq/L       3.5-5.1         Specimen slightly hemolyzed



 

                CHLORIDE (BEAKER) (test code=382)    106 meq/L                  

 

                CO2 (BEAKER) (test code=355)    22 meq/L        22-29            

 

                BLOOD UREA NITROGEN (BEAKER) (test code=354)    22 mg/dL        7-21             

 

                CREATININE (BEAKER) (test code=358)    0.61 mg/dL      0.57-1.25       Specimen slightly hemolyzed



 

                GLUCOSE RANDOM (BEAKER) (test code=652)    130 mg/dL                  

 

                CALCIUM (BEAKER) (test code=697)    8.8 mg/dL       8.4-10.2         

 

                AST (SGOT) (BEAKER) (test code=353)    57 U/L          5-34            Specimen slightly hemolyzed

 

                ALT (SGPT) (BEAKER) (test code=347)    36 U/L          6-55            Specimen slightly hemolyzed

 

                EGFR (BEAKER) (test code=1092)    99 mL/min/1.73 sq m                    ESTIMATED GFR IS NOT AS 

ACCURATE AS CREATININE CLEARANCE IN PREDICTING GLOMERULAR FILTRATION RATE. 
ESTIMATED GFR IS NOT APPLICABLE FOR DIALYSIS PATIENTS.





Specimen slightly ictericPT/[id number] 08:00:00* 





                Test Item       Value           Reference Range    Comments

 

                PROTIME (BEAKER) (test code=759)    16.6 seconds    11.7-14.7        

 

                INR (BEAKER) (test code=370)    1.4             <=5.9            

 

                PARTIAL THROMBOPLASTIN TIME (BEAKER) (test code=760)    37.8 seconds    22.5-36.0        







RECOMMENDED COUMADIN/WARFARIN INR THERAPY RANGESSTANDARD DOSE: 2.0 - 3.0   Inclu
chu: PROPHYLAXIS for venous thrombosis, systemic embolization; TREATMENT for brody
ous thrombosis and/or pulmonary embolus.HIGH RISK: Target INR is 2.5-3.5 for pat
ients with mechanical heart valves.JOSELITO TITER AND [id number] 13:45:00* 





                Test Item       Value           Reference Range    Comments

 

                JOSELITO TITER (BEAKER) (test code=1541)    >=:2560                          

 

                JOSELITO PATTERN (BEAKER) (test code=1781)    Homogeneous                      





ANTI-NUCLEAR ANTIBODY (JOSELITO)2017 13:31:00* 





                Test Item       Value           Reference Range    Comments

 

                ANTI-NUCLEAR ANTIBODY (JOSELITO) (BEAKER) (test code=418)    Positive        Negative         





ALPHA FETOPROTEIN (AFP), TUMOR [id number] 16:06:00* 





                Test Item       Value           Reference Range    Comments

 

                ALPHA-FETOPROTEIN (BEAKER) (test code=1094)    5.7 ng/mL       <10.0            





Effective 2014: Reference Range ChangeNew: <10.0   Previous: 0.0-8.0
HEPATIC FUNCTION [id number] 15:45:00* 





                Test Item       Value           Reference Range    Comments

 

                TOTAL PROTEIN (BEAKER) (test code=770)    8.2 gm/dL       6.0-8.3          

 

                ALBUMIN (BEAKER) (test code=1145)    3.2 g/dL        3.5-5.0          

 

                BILIRUBIN TOTAL (BEAKER) (test code=377)    0.9 mg/dL       0.2-1.2          

 

                BILIRUBIN DIRECT (BEAKER) (test code=706)    0.4 mg/dL       0.1-0.5          

 

                ALKALINE PHOSPHATASE (BEAKER) (test code=346)    136 U/L                    

 

                AST (SGOT) (BEAKER) (test code=353)    53 U/L          5-34             

 

                ALT (SGPT) (BEAKER) (test code=347)    34 U/L          6-55             





BASIC METABOLIC [id number] 15:45:00* 





                Test Item       Value           Reference Range    Comments

 

                SODIUM (BEAKER) (test code=381)    136 meq/L       136-145          

 

                POTASSIUM (BEAKER) (test code=379)    3.6 meq/L       3.5-5.1          

 

                CHLORIDE (BEAKER) (test code=382)    104 meq/L                  

 

                CO2 (BEAKER) (test code=355)    24 meq/L        22-29            

 

                BLOOD UREA NITROGEN (BEAKER) (test code=354)    14 mg/dL        7-21             

 

                CREATININE (BEAKER) (test code=358)    0.66 mg/dL      0.57-1.25        

 

                GLUCOSE RANDOM (BEAKER) (test code=652)    260 mg/dL                  

 

                CALCIUM (BEAKER) (test code=697)    9.2 mg/dL       8.4-10.2         

 

                EGFR (BEAKER) (test code=1092)    90 mL/min/1.73 sq m                    ESTIMATED GFR IS NOT AS 

ACCURATE AS CREATININE CLEARANCE IN PREDICTING GLOMERULAR FILTRATION RATE. 
ESTIMATED GFR IS NOT APPLICABLE FOR DIALYSIS PATIENTS.





IMMUNOGLOBULIN G (IGG)2017 15:44:00* 





                Test Item       Value           Reference Range    Comments

 

                IMMUNOGLOBULIN G (IGG) (BEAKER) (test code=427)    2682 mg/dL      540-1822         





IMMUNOGLOBULIN M (IGM)2017 15:44:00* 





                Test Item       Value           Reference Range    Comments

 

                IMMUNOGLOBULIN M (IGM) (BEAKER) (test code=638)    128 mg/dL                  





PROTHROMBIN TIME/[id number] 15:31:00* 





                Test Item       Value           Reference Range    Comments

 

                PROTIME (BEAKER) (test code=759)    16.0 seconds    11.7-14.7        

 

                INR (BEAKER) (test code=370)    1.3             <=5.9            





RECOMMENDED COUMADIN/WARFARIN INR THERAPY RANGESSTANDARD DOSE: 2.0 - 3.0   Inclu
chu: PROPHYLAXIS for venous thrombosis, systemic embolization; TREATMENT for brody
ous thrombosis and/or pulmonary embolus.HIGH RISK: Target INR is 2.5-3.5 for pat
ients with mechanical heart valves.CBC W/PLT COUNT & AUTO [id number]
15:30:00* 





                Test Item       Value           Reference Range    Comments

 

                WHITE BLOOD CELL COUNT (BEAKER) (test code=775)    3.3 K/ L        4.0-10.0         

 

                RED BLOOD CELL COUNT (BEAKER) (test code=761)    3.94 M/ L       4.00-5.00        

 

                HEMOGLOBIN (BEAKER) (test code=410)    13.3 GM/DL      12.0-15.0        

 

                HEMATOCRIT (BEAKER) (test code=411)    40.1 %          36.0-45.0        

 

                MEAN CORPUSCULAR VOLUME (BEAKER) (test code=753)    102.0 fL        82.0-99.0        

 

                MEAN CORPUSCULAR HEMOGLOBIN (BEAKER) (test code=751)    33.7 pg         27.0-33.0        

 

                    MEAN CORPUSCULAR HEMOGLOBIN CONC (BEAKER) (test code=752)    33.2 GM/DL          32.0-36.0

                                         

 

                RED CELL DISTRIBUTION WIDTH (BEAKER) (test code=412)    13.6 %          10.3-14.2        

 

                PLATELET COUNT (BEAKER) (test code=756)    87 K/CU MM      150-430          

 

                MEAN PLATELET VOLUME (BEAKER) (test code=754)    8.9 fL          6.5-10.5         

 

                NUCLEATED RED BLOOD CELLS (BEAKER) (test code=413)    0 /100 WBC      0-0              

 

                NEUTROPHILS RELATIVE PERCENT (BEAKER) (test code=429)    55 %                             

 

                LYMPHOCYTES RELATIVE PERCENT (BEAKER) (test code=430)    30 %                             

 

                MONOCYTES RELATIVE PERCENT (BEAKER) (test code=431)    12 %                             

 

                EOSINOPHILS RELATIVE PERCENT (BEAKER) (test code=432)    2 %                              

 

                BASOPHILS RELATIVE PERCENT (BEAKER) (test code=437)    0 %                              

 

                NEUTROPHILS ABSOLUTE COUNT (BEAKER) (test code=670)    1.82 K/ L       1.80-8.00        

 

                LYMPHOCYTES ABSOLUTE COUNT (BEAKER) (test code=414)    0.99 K/ L       1.48-4.50        

 

                MONOCYTES ABSOLUTE COUNT (BEAKER) (test code=415)    0.41 K/ L       0.00-1.30        

 

                EOSINOPHILS ABSOLUTE COUNT (BEAKER) (test code=416)    0.08 K/ L       0.00-0.50        

 

                BASOPHILS ABSOLUTE COUNT (BEAKER) (test code=417)    0.01 K/ L       0.00-0.20        





0.00

## 2019-01-25 NOTE — XMS REPORT
Summary of Care: 3/27/18 - 3/28/18

                             Created on: 2059



ADITI JOSEPH

External Reference #: [id number]

: 1950

Sex: Female



Demographics







                          Address                   [address] [place] [address]  [address]

 

                          Home Phone                [phone]

 

                          Preferred Language        English

 

                          Marital Status            

 

                          Yazidi Affiliation     None

 

                          Race                      Other

 

                                        Additional Race(s)  

 

                          Ethnic Group              Non-





Author







                          Author                    [facility]              [facility]

 

                          Address                   Unknown

 

                          Phone                     Unavailable







Encounter





HQ Luda_jeny(FIN) [id number] Date(s): 3/27/18 - 3/28/18

[facility] [place] [facility] [address], [address] [place], [place] [address] 





Vital Signs





No data available for this section



Problem List







    



              Condition     Effective Dates     Status       Health Status     Informant

 

    



                           Diabetes(Confirmed)       Active  

 

    



                           Acid reflux               Active  



                                         disease(Confirmed)    

 

    



                           Hypertension(Confirm      Active  



                                         ed)    

 

    



                           Radiculopathy(Confir      Active  



                                         med)1    

 

    



                           Simple                    Active  



                                         obesity(Confirmed)    







1L3-4



Allergies, Adverse Reactions, Alerts







   



                 Substance       Reaction        Severity        Status

 

   



                           NKDA                      Active







Medications





No data available for this section



Results





No data available for this section



Immunizations





No data available for this section



Procedures







    



              Procedure     Date         Related Diagnosis     Body Site     Status

 

    



                           Biopsy of liver           Completed

 

    



                            section          Completed

 

    



                           Cholecystectomy           Completed

 

    



                           Colonoscopy               Completed

 

    



                           Excision of cyst          Completed

 

    



                           Lumpectomy of breast        Completed







Social History







 



                           Social History Type       Response

 

 



                           Smoking Status            Never smoker; Exposure to Tobacco Smoke None; Cigarette Smoking

 Last 365



                                         Days No; Reg Smoking Cessation Counseling No



                                         entered on: 18







Assessment and Plan





No data available for this section

## 2019-01-25 NOTE — XMS REPORT
Summary of Care: 18 - 5/10/18

                             Created on: 2065



ADITI JOSEPH

External Reference #: [id number]

: 1950

Sex: Female



Demographics







                          Address                   [facility] [place]  [address]-

 

                          Home Phone                [phone]

 

                          Preferred Language        English

 

                          Marital Status            

 

                          Scientology Affiliation     None

 

                          Race                      Other

 

                                        Additional Race(s)  

 

                          Ethnic Group              Non-





Author







                          Author                    [facility]

 

                          Organization              [facility]

 

                          Address                   Unknown

 

                          Phone                     Unavailable







Encounter





HQ Luis Antonior_jeny(FIN) [phone] Date(s): 18 - 5/10/18

[facility] [address], [address] [place], [place] [address]-    
 





Vital Signs





No data available for this section



Problem List







    



              Condition     Effective Dates     Status       Health Status     Informant

 

    



                           Diabetes(Confirmed)       Active  

 

    



                           Acid reflux               Active  



                                         disease(Confirmed)    

 

    



                           Hypertension(Confirm      Active  



                                         ed)    

 

    



                           Radiculopathy(Confir      Active  



                                         med)1    

 

    



                           Simple                    Active  



                                         obesity(Confirmed)    







1L3-4



Allergies, Adverse Reactions, Alerts







   



                 Substance       Reaction        Severity        Status

 

   



                           NKDA                      Active







Medications





No data available for this section



Results





No data available for this section



Immunizations





No data available for this section



Procedures







    



              Procedure     Date         Related Diagnosis     Body Site     Status

 

    



                           Biopsy of liver           Completed

 

    



                            section          Completed

 

    



                           Cholecystectomy           Completed

 

    



                           Colonoscopy               Completed

 

    



                           Excision of cyst          Completed

 

    



                           Lumpectomy of breast        Completed







Social History







 



                           Social History Type       Response

 

 



                           Smoking Status            Never smoker; Exposure to Tobacco Smoke None; Cigarette Smoking

 Last 365



                                         Days No; Reg Smoking Cessation Counseling No



                                         entered on: 18







Assessment and Plan





No data available for this section

## 2019-01-25 NOTE — XMS REPORT
Summary of Care: 18 - 18

                             Created on: 2023



ADITI JOSEPH

External Reference #: [id number]

: 1950

Sex: Female



Demographics







                          Address                   [address] [place] [address]  [address]

 

                          Home Phone                [phone]

 

                          Preferred Language        English

 

                          Marital Status            

 

                          Rastafarian Affiliation     Unknown

 

                          Race                      Other

 

                                        Additional Race(s)  

 

                          Ethnic Group              /Latin





Author







                          Author                    [facility]

 

                          Organization              [facility]

 

                          Address                   Unknown

 

                          Phone                     Unavailable







Encounter





HQ Encntr_alias(FIN) [id number] Date(s): 18 - 18

[facility] [address], [address] [place], [place] [address] 





Vital Signs





No data available for this section



Problem List







    



              Condition     Effective Dates     Status       Health Status     Informant

 

    



                           Simple                    Active  



                                         obesity(Confirmed)    







Allergies, Adverse Reactions, Alerts





No data available for this section



Medications





No data available for this section



Results





No data available for this section



Immunizations





No data available for this section



Procedures







    



              Procedure     Date         Related Diagnosis     Body Site     Status

 

    



                           Cholecystectomy           Completed







Social History







 



                           Social History Type       Response

 

 



                           Smoking Status            Never smoker; Exposure to Tobacco Smoke None; Cigarette Smoking

 Last 365



                                         Days No; Reg Smoking Cessation Counseling No



                                         entered on: 4/10/18







Assessment and Plan





No data available for this section

## 2019-01-25 NOTE — XMS REPORT
Summary of Care: 12/15/17 - 12/15/17

                             Created on: 02/15/2051



ADITI JOSEPH

External Reference #: [id number]

: 1950

Sex: Female



Demographics







                          Address                   [address]  [place]  [address]-

 

                          Home Phone                [phone]

 

                          Preferred Language        Unknown

 

                          Marital Status            Unknown

 

                          Pentecostal Affiliation     Unknown

 

                          Race                      White

 

                          Additional Race(s)        White/



 

                          Ethnic Group              /Latin





Author







                          Author                    [facility]

 

                          Organization              [facility]

 

                          Address                   Unknown

 

                          Phone                     Unavailable







Encounter





HQ Encntr_alias(FIN) [id number] Date(s): 12/15/17 - 12/15/17

[facility] [address] [place] [address]      [phone]
[phone]

Discharge Disposition: Home or Self Care

Attending Physician: Rosendo Varela MD





Vital Signs





No data available for this section



Problem List





No data available for this section



Allergies, Adverse Reactions, Alerts





No data available for this section



Medications





No data available for this section



Results





No data available for this section



Immunizations





No data available for this section



Procedures





No data available for this section



Social History





No data available for this section



Assessment and Plan





No data available for this section

## 2019-01-28 NOTE — OPERATIVE REPORT
DATE OF PROCEDURE:  January 25, 2019 

 

PREOPERATIVE DIAGNOSES 

1.  Multiple chronic urinary tract infections.

2.  Clinical signs and symptoms of interstitial cystitis.



POSTOPERATIVE DIAGNOSES 

1.  Multiple chronic urinary tract infections.

2.  Clinical signs and symptoms of interstitial cystitis.



PROCEDURES 

1.  Cystourethroscopy and hydrodistention (entirely separate procedure for 

clinical and symptoms of interstitial cystitis).

2.  Cystourethroscopy with left ureteral catheterization and left 

retrograde pyelogram (separate procedure for microscopic hematuria and 

urinary tract infections).

3.  Cystourethroscopy with right ureteral catheterization and right 

retrograde pyelogram (separate procedure performed for multiple urinary 

tract infections).

4.  Supervision of fluoroscopy.

5.  Interpretation of retrograde ureteropyelography.



ANESTHESIA:  General.



ESTIMATED BLOOD LOSS:  Minimal.



COMPLICATIONS:  None.



INDICATIONS:  Ms Ventura is a 68-year-old female patient with a history of 

multiple chronic urinary tract infections, and clinical symptoms of 

interstitial cystitis.  She and I had a long discussion about alternatives, 

risks and benefits, including doing nothing, cystoscopy, IVP, 

hydrodistention, and retrograde pyelograms and renal ultrasound.  Due to 

the complex nature, she elected to proceed with hydrodistention and 

retrograde pyelograms.  She voiced understanding of the options, 

alternatives, risks, and benefits and elected to proceed.



PROCEDURE IN DETAIL:  After informed consent was obtained, the patient was 

taken to the operating suite.  She was placed supine on the table and 

underwent general anesthesia by the anesthesia service.  She was placed in 

the dorsal lithotomy position and sterilely prepped and draped for 

cystoscopy.  A grade 4 cystocele was noted, grade 3-4 prolapse, positive 

vaginal atrophy.  The urethra was catheterized with a 22.5-Greek 

cystoscope and panendoscopy of the bladder revealed no tumors and no 

stones.  Both ureteral orifices were within normal anatomic location and 

position and seen to efflux clear urine.  Hydrodistention was performed to 

the capacity of 800 mL.  No glomerulations.  No Jeanmarie's ulcers.  Bilateral 

retrograde pyelogram was performed, which were normal.  The bladder was 

drained.  The patient was awakened from anesthesia and transported to the 

recovery room in excellent condition.



SUPERVISION OF FLUOROSCOPY, INTERPRETATION OF RETROGRADE URETERAL 

PYELOGRAPHY:  I was present throughout the entire procedure and I 

supervised the use of fluoroscopy as no radiologist was present at any time 

during this procedure.  Attention was turned toward the left and right 

ureteral orifices, catheterized with an 8-Greek cone-tipped catheter.  In 

a retrograde fashion, contrast was injected.  It revealed delicate ureters, 

delicate pelviceal systems.  No evidence of filling defects.  No evidence 

of hydronephrosis.



IMPRESSION:  Normal retrograde pyelograms.









DD:  01/28/2019 08:27

DT:  01/28/2019 08:43

Job#:  [id number]

## 2022-08-31 NOTE — XMS REPORT
Summary of Care: 18 - 18

                             Created on: 2114



JOSEPHADITI

External Reference #: [id number]

: 1950

Sex: Female



Demographics







                          Address                   [address]

[place]  [address]-

 

                          Home Phone                [phone]

 

                          Preferred Language        Unknown

 

                          Marital Status            

 

                          Pentecostal Affiliation     None

 

                          Race                      Other

 

                                        Additional Race(s)  

 

                          Ethnic Group              Non-





Author







                          Author                    [facility]

 

                          Organization              [facility]

 

                          Address                   Unknown

 

                          Phone                     Unavailable







Encounter





ESTEFANY Ayers(TOMMY) [id number] Date(s): 18 - 18

[facility] [address] [place] [address]-     [phone]
[phone]

Discharge Disposition: Home or Self Care

Attending Physician: Analy Dixon MD

Referring Physician: Analy Dixon MD





Vital Signs







                    1                   2                   3



                                         Most recent to   



                                         oldest [Reference   



                                         Range]:   

 

                                        154.94 cm 

                    (18 4:05 PM)                         



                                         Height   

 

                                        98.0 DegF 

                    (18 4:10 PM)                         



                                         Temperature Oral   



                                         [96.4-99.1 DegF]   

 

                                        145/71 mmHg 

*HI*

                          (18 10:45 AM)        140/78 mmHg 

                          (18 10:30 AM)        143/82 mmHg 

*HI*

                                        (18 10:15 AM)



                                         Blood Pressure   



                                         [/60-90 mmHg]   

 

                                        20 BRMIN 

                          (18 10:45 AM)        17 BRMIN 

                          (18 10:30 AM)        18 BRMIN 

                                        (18 10:15 AM)



                                         Respiratory Rate   



                                         [14-20 BRMIN]   

 

                                        72 bpm 

                    (18 4:10 PM)                         



                                         Peripheral Pulse   



                                         Rate [ bpm]   

 

                                        79.205 kg 

                    (18 4:05 PM)                         



                                         Weight   

 

                                        32.99 m2 

                    (18 4:05 PM)                         



                                         Body Mass Index   







Problem List







    



              Condition     Effective Dates     Status       Health Status     Informant

 

    



                           Diabetes(Confirmed)       Active  

 

    



                           Acid reflux               Active  



                                         disease(Confirmed)    

 

    



                           Hypertension(Confirm      Active  



                                         ed)    

 

    



                           Radiculopathy(Confir      Active  



                                         med)1    

 

    



                           Simple                    Active  



                                         obesity(Confirmed)    







1L3-4



Allergies, Adverse Reactions, Alerts







   



                 Substance       Reaction        Severity        Status

 

   



                           NKDA                      Active







Medications





ANES acetaminophen

1,000 mg, Route: PO, Drug form: TAB, ONCE, Dosing Weight 79.205, kg, PRN Pain Sc
ore 1-3, Start date: 18 13:26:00 CDT

Start Date: 18

Stop Date: 18

Status: Discontinued



ANES albuterol 0.083% inhalation solution

2.49 mg, Route: NEB, Q20Min, Dosing Weight 79.205, kg, PRN Wheezing, Priority: S
TAT, Start date: 18 13:26:00 CDT, Duration: 30 day, Stop date: 18 13
:25:00 CDT

Start Date: 18

Stop Date: 18

Status: Discontinued



ANES dexamethasone

4 mg, Route: IVP, ONCE, Dosing Weight 79.205, kg, PRN Nausea & Vomiting, Start 
date: 18 13:26:00 CDT

Start Date: 18

Stop Date: 18

Status: Discontinued



ANES diphenhydrAMINE

12.5 mg, Route: IVP, Drug form: INJ, Q6H, Dosing Weight 79.205, kg, PRN Itching,
Start date: 18 13:26:00 CDT, Duration: 30 day, Stop date: 18 13:25:
00 CDT

Start Date: 18

Stop Date: 18

Status: Discontinued



ANES fentaNYL

25 microgram, Route: IVP, Q5Min, Dosing Weight 79.205, kg, PRN, Priority: Routin
e, Start date: 18 13:26:00 CDT, Duration: 4 doses or times, Stop date: Sukhi moore # of times, Pain Score 4-10

Start Date: 18

Stop Date: 18

Status: Discontinued



ANES flumazenil

0.2 mg, Route: IVP, PRN, Dosing Weight 79.205, kg, PRN Benzodiazepine Reversal, 
Initial dose, Start date: 18 13:26:00 CDT, Duration: 30 day, Stop date:  13:25:00 CDT

Start Date: 18

Stop Date: 18

Status: Discontinued



ANES hydrALAZINE

5 mg, Route: IVP, Q20Min, Dosing Weight 79.205, kg, PRN Elevated BP, Start date:
18 13:26:00 CDT, Duration: 2 doses or times, Stop date: Limited # of times

Start Date: 18

Stop Date: 18

Status: Discontinued



ANES HYDROmorphone

0.5 mg, Route: IVP, Q5Min, Dosing Weight 79.205, kg, PRN Pain Score 7-10, Start 
date: 18 13:26:00 CDT, Duration: 4 doses or times, Stop date: Limited # of
times

Start Date: 18

Stop Date: 18

Status: Discontinued



ANES labetalol

5 mg, Route: IVP, Q5Min, Dosing Weight 79.205, kg, PRN Elevated BP, Start date: 
18 13:26:00 CDT, Duration: 5 doses or times, Stop date: Limited # of times

Start Date: 18

Stop Date: 18

Status: Discontinued



ANES naloxone

0.4 mg, Route: IVP, Q2MIN, Dosing Weight 79.205, kg, PRN Narcotic Reversal, Star
t date: 18 13:26:00 CDT, Duration: 8 doses or times, Stop date: Limited # 
of times

Start Date: 18

Stop Date: 18

Status: Discontinued



ANES ondansetron

4 mg, Route: IVP, ONCE, Dosing Weight 79.205, kg, PRN Nausea & Vomiting, Start 
date: 18 13:26:00 CDT

Start Date: 18

Stop Date: 18

Status: Discontinued



ANES promethazine

6.25 mg, Route: IVPB, ONCE, Dosing Weight 79.205, kg, PRN Nausea & Vomiting, 
Start date: 18 13:26:00 CDT

Start Date: 18

Stop Date: 18

Status: Discontinued



azaTHIOprine 50 mg oral tablet

50 mg=1 tab, PO, Daily, # 30 tab, 0 Refill(s)

Start Date: 18

Status: Ordered



Calcium 600+D oral tablet

1 tab, PO, Daily, 0 Refill(s)

Start Date: 18

Status: Ordered



hydroxychloroquine sulfate 200 mg oral tablet

400 mg=2 tab, PO, Daily, # 60 tab, 0 Refill(s)

Start Date: 18

Status: Ordered



Lactated Ringers Injection IV 1000 mL

1,000 mL, Rate: 25 ml/hr, Infuse over: 40 hr, Route: IV, Dosing Weight 79.205 kg
, Total Volume: 1,000, Start date: 18 13:26:00 CDT, Duration: 30 day, Stop
date: 18 13:25:00 CDT, 1.88, m2

Start Date: 18

Stop Date: 18

Status: Discontinued



Lactated Ringers Injection IV 1000 mL

1,000 mL, Rate: 125 ml/hr, Infuse over: 8 hr, Route: IV, Dosing Weight 79.205 kg
, Total Volume: 1,000, Start date: 18 13:26:00 CDT, Duration: 30 day, Stop
date: 18 13:25:00 CDT, 1.88, m2

Start Date: 18

Stop Date: 18

Status: Discontinued



lactulose 10 g/15 mL oral syrup

10 gm=15 mL, PO, BID, PRN constipation, # 240 mL, 0 Refill(s)

Start Date: 18

Stop Date: 18

Status: Ordered



Metoprolol Succinate ER 25 mg oral tablet, extended release

25 mg=1 tab, PO, Daily, # 30 tab, 0 Refill(s)

Start Date: 18

Status: Ordered



Results





ELECTROLYTES





  



                     Most recent to      1                   2



                                         oldest [Reference  



                                         Range]:  

 

  



                           Sodium Lvl [135-145       139 mEq/L 



                           mEq/L]                    (18 4:25 PM) 

 

  



                           Potassium Lvl             3.9 mEq/L 



                           [3.5-5.1 mEq/L]           (18 4:25 PM) 

 

  



                           Chloride Lvl [      105 mEq/L 



                           mEq/L]                    (18 4:25 PM) 

 

  



                           CO2 [24-32 mEq/L]         29 mEq/L 



                                         (18 4:25 PM) 

 

  



                           AGAP [10.0-20.0           8.9 mEq/L 



                           mEq/L]                    *LOW* 



                                         (5/11/18 4:25 PM) 







CHEM PANEL





  



                     Most recent to      1                   2



                                         oldest [Reference  



                                         Range]:  

 

  



                           Creatinine Lvl            0.63 mg/dL 



                           [0.50-1.40 mg/dL]         (18 4:25 PM) 

 

  



                           eGFR                      94 mL/min/1.73m2 1 



                                         *NA* 



                                         (18 4:25 PM) 

 

  



                           BUN [7-22 mg/dL]          16 mg/dL 



                                         (18 4:25 PM) 

 

  



                           Glucose Lvl [70-99        188 mg/dL 



                           mg/dL]                    *HI* 



                                         (18 4:25 PM) 

 

  



                           Calcium Lvl               8.6 mg/dL 



                           [8.5-10.5 mg/dL]          (18 4:25 PM) 







1Result Comment: The eGFR is calculated using the CKD-EPI formula. In most 
young, healthy individuals the eGFR will be >90 mL/min/1.73m2. The eGFR declines
with age. An eGFR of 60-89 may be normal in some populations, particularly the 
elderly, for whom the CKD-EPI formula has not been extensively validated. Use of
the eGFR is not recommended in the following populations:



Individuals with unstable creatinine concentrations, including pregnant patients
and those with serious co-morbid conditions.



Patients with extremes in muscle mass or diet. 



The data above are obtained from the National Kidney Disease Education Program (
NKDEP) which additionally recommends that when the eGFR is used in patients with
extremes of body mass index for purposes of drug dosing, the eGFR should be mul
tiplied by the estimated BMI.



HEMATOLOGY





  



                     Most recent to      1                   2



                                         oldest [Reference  



                                         Range]:  

 

  



                     WBC [3.7-10.4 K/CMM]     3.3 K/CMM           2.9 K/CMM



                           *LOW*                     *LOW*



                           (18 8:26 AM)         (18 4:25 PM)

 

  



                     RBC [4.20-5.40      3.65 M/CMM          3.73 M/CMM



                     M/CMM]              *LOW*               *LOW*



                           (18 8:26 AM)         (18 4:25 PM)

 

  



                     Hgb [12.0-16.0 g/dL]     12.5 g/dL           12.5 g/dL



                           (18 8:26 AM)         (18 4:25 PM)

 

  



                     Hct [36.0-48.0 %]     36.0 %              36.6 %



                           (18 8:26 AM)         (18 4:25 PM)

 

  



                     MCV [80.0-98.0 fL]     98.7 fL             97.9 fL



                           *HI*                      (18 4:25 PM)



                                         (18 8:26 AM) 

 

  



                     MCH [27.0-31.0 pg]     34.3 pg             33.6 pg



                           *HI*                      *HI*



                           (18 8:26 AM)         (18 4:25 PM)

 

  



                     MCHC [32.0-36.0     34.8 g/dL           34.3 g/dL



                     g/dL]               (18 8:26 AM)     (18 4:25 PM)

 

  



                     RDW [11.5-14.5 %]     14.5 %              14.1 %



                           (18 8:26 AM)         (18 4:25 PM)

 

  



                     MPV [7.4-10.4 fL]     8.9 fL              8.7 fL



                           (18 8:26 AM)         (18 4:25 PM)

 

  



                     Platelet [133-450     79 K/CMM            79 K/CMM



                     K/CMM]              *LOW*               *LOW*



                           (18 8:26 AM)         (18 4:25 PM)

 

  



                     Segs [45.0-75.0 %]     54.8 %              53.6 %



                           (18 8:26 AM)         (18 4:25 PM)

 

  



                     Lymphocytes         28.3 %              28.0 %



                     [20.0-40.0 %]       (18 8:26 AM)     (18 4:25 PM)

 

  



                     Monocytes [2.0-12.0     14.2 %              15.2 %



                     %]                  *HI*                *HI*



                           (18 8:26 AM)         (18 4:25 PM)

 

  



                     Eosinophils [0.0-4.0     2.3 %               2.8 %



                     %]                  (18 8:26 AM)     (18 4:25 PM)

 

  



                     Basophils [0.0-1.0     0.4 %               0.4 %



                     %]                  (18 8:26 AM)     (18 4:25 PM)

 

  



                     Segs-Bands #        1.8 K/CMM           1.5 K/CMM



                     [1.5-8.1 K/CMM]     (18 8:26 AM)     (18 4:25 PM)

 

  



                     Lymphocytes #       0.9 K/CMM           0.8 K/CMM



                     [1.0-5.5 K/CMM]     *LOW*               *LOW*



                           (18 8:26 AM)         (18 4:25 PM)

 

  



                     Monocytes # [0.0-0.8     0.5 K/CMM           0.4 K/CMM



                     K/CMM]              (18 8:26 AM)     (18 4:25 PM)

 

  



                     Eosinophils #       0.1 K/CMM           0.1 K/CMM



                     [0.0-0.5 K/CMM]     (18 8:26 AM)     (18 4:25 PM)

 

  



                     PT [12.0-14.7       17.0 seconds        16.4 seconds



                     seconds]            *HI*                *HI*



                           (18 8:26 AM)         (18 4:25 PM)

 

  



                     INR [0.85-1.17]     1.38                1.31



                           *HI*                      *HI*



                           (18 8:26 AM)         (18 4:25 PM)

 

  



                     PTT [22.9-35.8      33.7 seconds        38.5 seconds



                     seconds]            (18 8:26 AM)     *HI*



                                         (18 4:25 PM)







Immunizations





No data available for this section



Procedures







    



              Procedure     Date         Related Diagnosis     Body Site     Status

 

    



                     NJX INTERLAMINAR LMBR/SA     18             Completed

 

    



                           Biopsy of liver           Completed

 

    



                            section          Completed

 

    



                           Cholecystectomy           Completed

 

    



                           Colonoscopy               Completed

 

    



                           Excision of cyst          Completed

 

    



                           Lumpectomy of breast        Completed







Social History







 



                           Social History Type       Response

 

 



                           Smoking Status            Never smoker; Exposure to Tobacco Smoke None; Cigarette Smoking

 Last 365



                                         Days No; Reg Smoking Cessation Counseling No



                                         entered on: 18







Assessment and Plan





No data available for this section Consent (Nose)/Introductory Paragraph: The rationale for Mohs was explained to the patient and consent was obtained. The risks, benefits and alternatives to therapy were discussed in detail. Specifically, the risks of nasal deformity, changes in the flow of air through the nose, infection, scarring, bleeding, prolonged wound healing, incomplete removal, allergy to anesthesia, nerve injury and recurrence were addressed. Prior to the procedure, the treatment site was clearly identified and confirmed by the patient. All components of Universal Protocol/PAUSE Rule completed.